# Patient Record
Sex: MALE | Race: WHITE | NOT HISPANIC OR LATINO | Employment: UNEMPLOYED | URBAN - METROPOLITAN AREA
[De-identification: names, ages, dates, MRNs, and addresses within clinical notes are randomized per-mention and may not be internally consistent; named-entity substitution may affect disease eponyms.]

---
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
## 2023-01-23 DIAGNOSIS — F90.0 ATTENTION DEFICIT HYPERACTIVITY DISORDER (ADHD), PREDOMINANTLY INATTENTIVE TYPE: ICD-10-CM

## 2023-01-23 RX ORDER — METHYLPHENIDATE HYDROCHLORIDE 54 MG/1
54 TABLET ORAL DAILY
Qty: 30 TABLET | Refills: 0
Start: 2023-01-23 | End: 2023-01-23 | Stop reason: SDUPTHER

## 2023-01-23 RX ORDER — METHYLPHENIDATE HYDROCHLORIDE 54 MG/1
54 TABLET ORAL DAILY
Qty: 30 TABLET | Refills: 0 | Status: SHIPPED | OUTPATIENT
Start: 2023-01-23

## 2023-01-23 NOTE — TELEPHONE ENCOUNTER
Medication Refill Request     Name of Medication Concerta  Dose/Frequency 54mg/ 1 tablet daily  Quantity 30 tablet  Verified pharmacy   [x]  Verified ordering Provider   [x]  Does patient have enough for the next 3 days? Yes [x] No []  Does patient have a follow-up appointment scheduled?  Yes [x] No []   If so when is appointment: 02/06/23

## 2023-02-06 ENCOUNTER — OFFICE VISIT (OUTPATIENT)
Dept: PSYCHIATRY | Facility: CLINIC | Age: 18
End: 2023-02-06

## 2023-02-06 NOTE — PSYCH
MEDICATION MANAGEMENT NOTE        [facility]      Name and Date of Birth:  Walter Ortiz 21 y o  2005 MRN: [id number]    Date of Visit: February 6, 2023    Reason for Visit: Follow-up visit regarding medication management     Chief Complaint: "Everything good"      SUBJECTIVE:    Greg Grimm a 21 y o , white, male, domiciled with mother and father in [place], recently graduated from [employer] 20[employer], currently looking for [employer] a previous psychiatric history significant for ADHD, anxiety, excoriations, no past psychiatric hosptalizations medically complicated by PNES vs Epilepsy, osteochondroma, who was personally seen and evaluated at the [address] [place] [address] E outpatient clinic for follow-up regarding medication management  Sandra Knutson states that since their previous outpatient psychiatric appointment with this writer, reports everything is "good"    Denies depression and anxiety  Denies recent seizure-like activity  No appetite or sleep disturbance  Does not currently have a job, and is currently searching  Last appointment's treatment recommendations:   • Continue the following medications:   ? Concerta ER 54 mg daily for symptoms of ADHD  ? Prozac 30 mg qd for exocoriations  • Continue lifestyle modifications  • Medication management every 3 months    Presently, patient denies suicidal/homicidal ideation in addition to thoughts of self-injury; contracts for safety, see below for risk assessment  At conclusion of evaluation, patient is amenable to the recommendations of this writer including: continue psychotropic emd Also, patient is amenable to calling/contacting the outpatient office including this writer if any acute adverse effects of their medication regimen arise in addition to any comments or concerns pertaining to their psychiatric management    Patient is amenable to calling/contacting crisis and/or attending to the nearest emergency department if their clinical condition deteriorates to assure their safety and stability, stating that they are able to appropriately confide in their parents regarding their psychiatric state  Current Rating Scores:     Current PHQ-9   PHQ-2/9 Depression Screening    Little interest or pleasure in doing things: 1 - several days  Feeling down, depressed, or hopeless: 0 - not at all  Trouble falling or staying asleep, or sleeping too much: 0 - not at all  Feeling tired or having little energy: 0 - not at all  Poor appetite or overeatin - not at all  Feeling bad about yourself - or that you are a failure or have let yourself or your family down: 0 - not at all  Trouble concentrating on things, such as reading the newspaper or watching television: 0 - not at all  Moving or speaking so slowly that other people could have noticed  Or the opposite - being so fidgety or restless that you have been moving around a lot more than usual: 0 - not at all  Thoughts that you would be better off dead, or of hurting yourself in some way: 0 - not at all  PHQ-9 Score: 1   PHQ-9 Interpretation: No or Minimal depression          Psychiatric Review Of Systems:    Unchanged information from this writer's previous assessment is copied and italicized; information that has changed is bolded      Appetite: some overeating, has been working on it  Adverse eating: denies currently  Weight changes: intentional weight loss  Insomnia/sleeplessness: no  Fatigue/anergy: no  Anhedonia/lack of interest: no  Attention/concentration: no  Psychomotor agitation/retardation: no  Somatic symptoms: no  Anxiety/panic attack: no  Katia/hypomania: no  Hopelessness/helplessness/worthlessness: no  Self-injurious behavior/high-risk behavior: not currently, history of excoriations (last episode about 2 years ago)  Suicidal ideation: no  Homicidal ideation: no  Auditory hallucinations: no  Visual hallucinations: no  Other perceptual disturbances: no  Delusional thinking: no  Obsessive/compulsive symptoms: no  Review Of Systems:      Constitutional negative   ENT negative   Cardiovascular negative   Respiratory negative   Gastrointestinal negative   Genitourinary negative   Musculoskeletal negative   Integumentary negative   Neurological negative   Endocrine negative   Other Symptoms none, all other systems are negative     History Review: The following portions of the patient's history were reviewed and updated as appropriate: allergies, current medications, past family history, past medical history, past social history, past surgical history and problem list     Unchanged information from this writer's previous assessment is copied and italicized; information that has changed is bolded  Family Psychiatric History:      Family History[]? Expand by Default             Family History   Problem Relation Age of Onset   • Asthma Mother     • No Known Problems Father     • Depression Maternal Grandfather           Denies substance abuse or suicidality in immediate relations       Past Psychiatric History:      Previous inpatient psychiatric admissions: denies  Previous intensive outpatient psychiatric services: denies  Previous inpatient/outpatient substance abuse rehabilitation: denies  Present/previous outpatient psychiatric services: Previously seeing Dr Kaur Carson since 2019  Present/previous psychotherapy services: Was in therapy for "anger management before 7th grade  History of suicidal attempts/gestures: denies  History of violence/aggressive behaviors: denies (got into one fight, more verbal)  Present/previous psychotropic medication use:   Vyvanse up to 60 mg (significant weight loss)  Adderall XR 20 mg (irritability)  Strattera (ineffective)  Intuniv 1mg (discontinued, as he no longer needed it)  Focalin XR 30 mg (ineffective)  Prozac up to 30 mg  Substance Abuse History:     Patient denies history of alcohol, illict substance, or tobacco abuse  Patient denies previous legal actions or arrests related to substance intoxication including prior DWIs/DUIs  Bunny does not apear under the influence or withdrawal of any psychoactive substance throughout today's examination       Social History:     Developmental: denies a history of milestone/developmental delay  Denies a history of in-utero exposure to toxins/illicit substances  Did have an IEP for Math or need for special education  Living arrangement: lives with mother and father, and pets  Academic history: graduated HS in 6/2022  Marital history: single  Social support system: friends, parent  Vocational History: currently looking for job position, interested in [place], [employer]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  Access to firearms: denies direct access to weapons/firearms  Bunny [employer] no history of arrests or violence pertaining to use of a deadly weapon       Traumatic History:      Abuse: denies  Other Traumatic Events: denies         OBJECTIVE:     Vital signs in last 24 hours: There were no vitals filed for this visit      Mental Status Evaluation:    Appearance age appropriate, casually dressed, overweight   Behavior cooperative, calm   Speech normal rate, normal volume, normal pitch   Mood "good"   Affect normal range and intensity, appropriate   Thought Processes organized, goal directed   Associations intact associations   Thought Content no overt delusions   Perceptual Disturbances: no auditory hallucinations, no visual hallucinations   Abnormal Thoughts  Risk Potential Suicidal ideation - None at present  Homicidal ideation - None at present  Potential for aggression - No   Orientation oriented to person, place and situation   Memory recent and remote memory grossly intact   Consciousness alert and awake   Attention Span Concentration Span attention span and concentration are age appropriate   Intellect appears to be of average intelligence   Insight intact Judgement intact   Muscle Strength and  Gait normal gait and normal balance   Motor activity no abnormal movements   Language no difficulty naming common objects, no difficulty repeating a phrase   Fund of Knowledge adequate knowledge of current events  adequate fund of knowledge regarding past history  adequate fund of knowledge regarding vocabulary    Pain none   Pain Scale 0     Laboratory Results: I have personally reviewed all pertinent laboratory/tests results    Recent Labs (last 6 months):   No visits with results within 6 Month(s) from this visit  Latest known visit with results is:   Orders Only on 10/12/2021   Component Date Value   • SARS-CoV-2 10/12/2021 Negative        Suicide/Homicide Risk Assessment:    Risk of Harm to Self:  The following ratings are based on assessment at the time of the interview  Demographic risk factors include: , male, age: young adult (15-24)  Historical Risk Factors include: none  Recent Specific Risk Factors include: none  Protective Factors: no current suicidal ideation  Based on today's assessment, Bunny presents the following risk of harm to self: minimal    Risk of Harm to Others: The following ratings are based on assessment at the time of the interview  Demographic Risk Factors include: male, unemployed, 14-21 years of age  Historical Risk Factors include: none  Recent Specific Risk Factors include: none  Protective Factors: no current homicidal ideation  Based on today's assessment, Bunny presents the following risk of harm to others: minimal    The following interventions are recommended: no intervention changes needed  Although patient's acute lethality risk is low, long-term/chronic lethality risk is mildly elevated in the presence of see above  At the current moment, Virgil Najjar is future-oriented, forward-thinking, and demonstrates ability to act in a self-preserving manner as evidenced by volitionally presenting to the clinic today, seeking treatment  To mitigate future risk, patient should adhere to the recommendations of this writer, avoid alcohol/illicit substance use, utilize community-based resources and familiar support and prioritize mental health treatment  Based on today's assessment and clinical criteria, Bunny Fritz Junior contracts for safety and is not an imminent risk of harm to self or others  Outpatient level of care is deemed appropriate at this present time  Manuel Cyr understands that if they are no longer able to contract for safety, they need to call/contact the outpatient office including this writer, call/contact crisis and/orattend to the nearest Emergency Department for immediate evaluation  Assessment/Plan:     Manuel Cyr was personally seen and evaluated today at the [address] [place] [address] [address] outpatient clinic for follow-up regarding medication management  His symptoms of ADHD are managed under current medications  Denies any depression or anxiety  Denies any SI, HI, AVH  Denies any recent seizure-like activities  Denies any recent excoriations  DSM-5 Diagnoses:     • ADHD, predominantly inattentive type managed on current medications  • Excoriations, history of, 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    Treatment Recommendations/Precautions:    • Continue the following medications:  o Concerta ER 54 mg daily for symptoms of ADHD  o Prozac 30 mg daily for excoriation  • Medication management every 3 months  • Aware of need to follow up with family physician for medical issues  • Aware of 24 hour and weekend coverage for urgent situations accessed by calling [facility] main practice number    Medications Risks/Benefits      Risks, Benefits And Possible Side Effects Of Medications:    Risks, benefits, and possible side effects of medications explained to Bunny and he verbalizes understanding and agreement for treatment   including: PARQ completed including serotonin syndrome, SIADH, worsening depression, suicidality, induction of ghassan, GI upset, headaches, activation, sexual side effects, sedation, potential drug interactions, and others  PARQ completed including elevated heart rate, elevated bp, seizures, anxiety/irritability, activation/induction of ghassan, abuse potential, interactions with other medications, risk of sudden death, appetite suppression/weight loss and other risks  For MALES- rare priapism        Controlled Medication Discussion:     Blima Oppenheim has been filling controlled prescriptions on time as prescribed according to [address] Monitoring Program    Psychotherapy Provided:     Individual psychotherapy provided: No     Treatment Plan:    Completed and signed during the session: Not applicable - Treatment Plan not due at this session    This note was shared with patient      Visit Time    Visit Start Time: 1:34 PM  Visit Stop Time: 1:46 PM  Total Visit Duration: 11 minutes    Ethelene Severin, MD 02/06/23

## 2023-03-02 DIAGNOSIS — F90.0 ATTENTION DEFICIT HYPERACTIVITY DISORDER (ADHD), PREDOMINANTLY INATTENTIVE TYPE: ICD-10-CM

## 2023-03-02 RX ORDER — METHYLPHENIDATE HYDROCHLORIDE 54 MG/1
54 TABLET ORAL DAILY
Qty: 30 TABLET | Refills: 0 | Status: SHIPPED | OUTPATIENT
Start: 2023-03-02

## 2023-03-02 NOTE — TELEPHONE ENCOUNTER
Medication Refill Request     Name of Medication Methylphenidate  Dose/Frequency 1 tablet daily  Quantity 30  Verified pharmacy   [x]  Verified ordering Provider   [x]  Does patient have enough for the next 3 days? Yes [] No [x]  Does patient have a follow-up appointment scheduled?  Yes [x] No []   If so when is appointment: 5/8/2023 1:30pm

## 2023-04-03 DIAGNOSIS — F90.0 ATTENTION DEFICIT HYPERACTIVITY DISORDER (ADHD), PREDOMINANTLY INATTENTIVE TYPE: ICD-10-CM

## 2023-04-03 NOTE — TELEPHONE ENCOUNTER
Medication Refill Request     Name of Medication Methylphenidate (concerta)  Dose/Frequency 54mg  Quantity 30  Verified pharmacy   [x]  Verified ordering Provider   [x]  Does patient have enough for the next 3 days? Yes [] No [x]  Does patient have a follow-up appointment scheduled?  Yes [x] No []   If so when is appointment: 5/8/23 @1:30pm

## 2023-04-04 RX ORDER — METHYLPHENIDATE HYDROCHLORIDE 54 MG/1
54 TABLET ORAL DAILY
Qty: 30 TABLET | Refills: 0 | Status: SHIPPED | OUTPATIENT
Start: 2023-04-04

## 2023-04-04 RX ORDER — METHYLPHENIDATE HYDROCHLORIDE 54 MG/1
54 TABLET ORAL DAILY
Qty: 30 TABLET | Refills: 0
Start: 2023-04-04

## 2023-05-05 DIAGNOSIS — F90.0 ATTENTION DEFICIT HYPERACTIVITY DISORDER (ADHD), PREDOMINANTLY INATTENTIVE TYPE: ICD-10-CM

## 2023-05-05 RX ORDER — METHYLPHENIDATE HYDROCHLORIDE 54 MG/1
54 TABLET ORAL DAILY
Qty: 30 TABLET | Refills: 0
Start: 2023-05-05

## 2023-05-08 ENCOUNTER — OFFICE VISIT (OUTPATIENT)
Dept: PSYCHIATRY | Facility: CLINIC | Age: 18
End: 2023-05-08

## 2023-05-08 DIAGNOSIS — F90.0 ATTENTION DEFICIT HYPERACTIVITY DISORDER (ADHD), PREDOMINANTLY INATTENTIVE TYPE: Primary | ICD-10-CM

## 2023-05-08 DIAGNOSIS — F42.4 EXCORIATION (SKIN-PICKING) DISORDER: ICD-10-CM

## 2023-05-08 RX ORDER — FLUOXETINE 10 MG/1
CAPSULE ORAL
Qty: 90 CAPSULE | Refills: 0 | Status: SHIPPED | OUTPATIENT
Start: 2023-05-08

## 2023-05-08 RX ORDER — METHYLPHENIDATE HYDROCHLORIDE 54 MG/1
54 TABLET ORAL DAILY
Qty: 30 TABLET | Refills: 0 | Status: SHIPPED | OUTPATIENT
Start: 2023-05-08

## 2023-05-08 RX ORDER — METHYLPHENIDATE HYDROCHLORIDE 54 MG/1
54 TABLET ORAL DAILY
Qty: 30 TABLET | Refills: 0
Start: 2023-05-08 | End: 2023-05-08 | Stop reason: SDUPTHER

## 2023-05-08 RX ORDER — METHYLPHENIDATE HYDROCHLORIDE 54 MG/1
54 TABLET ORAL DAILY
Qty: 30 TABLET | Refills: 0
Start: 2023-06-06

## 2023-05-08 RX ORDER — FLUOXETINE HYDROCHLORIDE 20 MG/1
20 CAPSULE ORAL DAILY
Qty: 90 CAPSULE | Refills: 0 | Status: SHIPPED | OUTPATIENT
Start: 2023-05-08

## 2023-05-08 RX ORDER — METHYLPHENIDATE HYDROCHLORIDE 54 MG/1
54 TABLET ORAL DAILY
Qty: 30 TABLET | Refills: 0
Start: 2023-07-04

## 2023-05-08 NOTE — BH TREATMENT PLAN
TREATMENT PLAN (Medication Management Only)        [address] [place] [address]    Name and Date of Birth:  Akshat Grandzachariah 21 y o  2005  Date of Treatment Plan: May 8, 2023  Diagnosis/Diagnoses:    1  Attention deficit hyperactivity disorder (ADHD), predominantly inattentive type    2  Excoriation (skin-picking) disorder      Strengths/Personal Resources for Self-Care: supportive family, taking medications as prescribed, ability to adapt to life changes, ability to communicate needs  Area/Areas of need (in own words): ADHD symptoms  1  Long Term Goal: maintain improvement in ADHD symptoms  Target Date:6 months - 11/8/2023  Person/Persons responsible for completion of goal: Bunny  2  Short Term Objective (s) - How will we reach this goal?:   A  Provider new recommended medication/dosage changes and/or continue medication(s): continue current medications as prescribed  B  Increase socialization with peers  C  Exercise regularly   Target Date:6 months - 11/8/2023  Person/Persons Responsible for Completion of Goal: Bunny  Progress Towards Goals: moderate progress  Treatment Modality: medication management every 3 months  Review due 180 days from date of this plan: 6 months - 11/8/2023  Expected length of service: maintenance  My Physician/PA/NP and I have developed this plan together and I agree to work on the goals and objectives  I understand the treatment goals that were developed for my treatment

## 2023-05-08 NOTE — PSYCH
"MEDICATION MANAGEMENT NOTE        [facility]      Name and Date of Birth:  Ave Yung 21 y o  2005 MRN: [id number]    Date of Visit: May 8, 2023    Reason for Visit: Follow-up visit regarding medication management     Chief Complaint: \"I am doing pretty good\"    SUBJECTIVE:    Ad purvis 21 y o , white, male, domiciled with mother and father in [place], recently graduated from [employer] 2022, currently looking for [address] a previous psychiatric history significant for ADHD, anxiety, excoriations, no past psychiatric hosptalizations medically complicated by PNES vs Epilepsy, osteochondroma, who was personally seen and evaluated at the [address] [place] [address] [address] outpatient clinic for follow-up regarding medication management  Linda Mccallaham states that since their previous outpatient psychiatric appointment with this writer, he reports he has been \"pretty good  \" Reports adequate coverage of symptoms of ADHD  Denies any mood symptoms as well as symptoms of anxiety  Denies any recent excoriations  Denies any other self-injurious behaviors  He states that he continues to look for a job  Presently, patient denies suicidal/homicidal ideation in addition to thoughts of self-injury; contracts for safety, see below for risk assessment  At conclusion of evaluation, patient is amenable to the recommendations of this writer including: continue psychotropic medications as prescribed  Also, patient is amenable to calling/contacting the outpatient office including this writer if any acute adverse effects of their medication regimen arise in addition to any comments or concerns pertaining to their psychiatric management    Patient is amenable to calling/contacting crisis and/or attending to the nearest emergency department if their clinical condition deteriorates to assure their safety and stability, stating that they " are able to appropriately confide in this writer regarding their psychiatric state  Current Rating Scores:     Current PHQ-9   PHQ-2/9 Depression Screening    Little interest or pleasure in doing things: 0 - not at all  Feeling down, depressed, or hopeless: 0 - not at all  Trouble falling or staying asleep, or sleeping too much: 0 - not at all  Feeling tired or having little energy: 0 - not at all  Poor appetite or overeatin - not at all  Feeling bad about yourself - or that you are a failure or have let yourself or your family down: 0 - not at all  Trouble concentrating on things, such as reading the newspaper or watching television: 0 - not at all  Moving or speaking so slowly that other people could have noticed  Or the opposite - being so fidgety or restless that you have been moving around a lot more than usual: 0 - not at all  Thoughts that you would be better off dead, or of hurting yourself in some way: 0 - not at all  PHQ-9 Score: 0   PHQ-9 Interpretation: No or Minimal depression        Current ROWDY-7 is   ROWDY-7 Flowsheet Screening    Flowsheet Row Most Recent Value   Over the last 2 weeks, how often have you been bothered by any of the following problems? Feeling nervous, anxious, or on edge 0   Not being able to stop or control worrying 0   Worrying too much about different things 0   Trouble relaxing 0   Being so restless that it is hard to sit still 0   Becoming easily annoyed or irritable 0   Feeling afraid as if something awful might happen 0   ROWDY-7 Total Score 0        Psychiatric Review Of Systems:    Unchanged information from this writer's previous assessment is copied and italicized; information that has changed is bolded      Appetite: some overeating, has been working on it  Adverse eating: denies currently  Weight changes: intentional weight loss  Insomnia/sleeplessness: no  Fatigue/anergy: no  Anhedonia/lack of interest: no  Attention/concentration: no  Psychomotor "agitation/retardation: no  Somatic symptoms: no  Anxiety/panic attack: no  Katia/hypomania: no  Hopelessness/helplessness/worthlessness: no  Self-injurious behavior/high-risk behavior: not currently, history of excoriations (last episode about 2 years ago)  Suicidal ideation: no  Homicidal ideation: no  Auditory hallucinations: no  Visual hallucinations: no  Other perceptual disturbances: no  Delusional thinking: no  Obsessive/compulsive symptoms: no  Review Of Systems:      Constitutional negative   ENT negative   Cardiovascular negative   Respiratory negative   Gastrointestinal negative   Genitourinary negative   Musculoskeletal negative   Integumentary negative   Neurological negative   Endocrine negative   Other Symptoms none, all other systems are negative     History Review: The following portions of the patient's history were reviewed and updated as appropriate: allergies, current medications, past family history, past medical history, past social history, past surgical history and problem list     Unchanged information from this writer's previous assessment is copied and italicized; information that has changed is bolded        Family Psychiatric History:      Family History[]? ? Expand by Default             Family History   Problem Relation Age of Onset   • Asthma Mother     • No Known Problems Father     • Depression Maternal Grandfather           Denies substance abuse or suicidality in immediate relations       Past Psychiatric History:      Previous inpatient psychiatric admissions: denies  Previous intensive outpatient psychiatric services: denies  Previous inpatient/outpatient substance abuse rehabilitation: denies  Present/previous outpatient psychiatric services: Previously seeing Dr Gómez Malcolm since 2019  Present/previous psychotherapy services: Was in therapy for \"anger management before 7th grade  History of suicidal attempts/gestures: denies  History of violence/aggressive behaviors: denies " (got into one fight, more verbal)  Present/previous psychotropic medication use:   Vyvanse up to 60 mg (significant weight loss)  Adderall XR 20 mg (irritability)  Strattera (ineffective)  Intuniv 1mg (discontinued, as he no longer needed it)  Focalin XR 30 mg (ineffective)  Prozac up to 30 mg  Substance Abuse History:     Patient denies history of alcohol, illict substance, or tobacco abuse  Patient denies previous legal actions or arrests related to substance intoxication including prior DWIs/DUIs  Bunny does not apear under the influence or withdrawal of any psychoactive substance throughout today's examination       Social History:     Developmental: denies a history of milestone/developmental delay  Denies a history of in-utero exposure to toxins/illicit substances  Did have an IEP for Math or need for special education  Living arrangement: lives with mother and father, and pets  Academic history: graduated HS in 6/2022  Marital history: single  Social support system: friends, parent  Vocational History: currently looking for job position, interested in [place], [facility]  Access to firearms: denies direct access to weapons/firearms  Bunny [employer] no history of arrests or violence pertaining to use of a deadly weapon       Traumatic History:      Abuse: denies  Other Traumatic Events: denies            OBJECTIVE:     Vital signs in last 24 hours: There were no vitals filed for this visit      Mental Status Evaluation:    Appearance age appropriate, casually dressed   Behavior pleasant, cooperative, calm   Speech normal rate, normal volume, normal pitch   Mood euthymic   Affect Brighter than previous appointments   Thought Processes organized, goal directed   Associations intact associations   Thought Content no overt delusions   Perceptual Disturbances: no auditory hallucinations, no visual hallucinations   Abnormal Thoughts  Risk Potential Suicidal ideation - None  Homicidal ideation - None  Potential for aggression - No   Orientation oriented to person, place, time/date and situation   Memory recent and remote memory grossly intact   Consciousness alert and awake   Attention Span Concentration Span attention span and concentration are age appropriate   Intellect appears to be of average intelligence   Insight intact   Judgement intact   Muscle Strength and  Gait normal gait and normal balance   Motor activity no abnormal movements   Language no difficulty naming common objects, no difficulty repeating a phrase   Fund of Knowledge adequate knowledge of current events  adequate fund of knowledge regarding past history  adequate fund of knowledge regarding vocabulary    Pain none   Pain Scale 0     Laboratory Results: I have personally reviewed all pertinent laboratory/tests results    Recent Labs (last 6 months):   No visits with results within 6 Month(s) from this visit  Latest known visit with results is:   Orders Only on 10/12/2021   Component Date Value   • SARS-CoV-2 10/12/2021 Negative        Suicide/Homicide Risk Assessment:    Risk of Harm to Self:  The following ratings are based on assessment at the time of the interview  Demographic risk factors include: , male  Historical Risk Factors include: none  Recent Specific Risk Factors include: none  Protective Factors: no current suicidal ideation  Based on today's assessmentBunny presents the following risk of harm to self: minimal    Risk of Harm to Others: The following ratings are based on assessment at the time of the interview  Demographic Risk Factors include: male, under age 36  Historical Risk Factors include: none  Recent Specific Risk Factors include: none  Protective Factors: no current homicidal ideation  Based on today's assessmentBunny presents the following risk of harm to others: minimal    The following interventions are recommended: no intervention changes needed   Although patient's acute lethality risk is low, long-term/chronic lethality risk is mildly elevated in the presence of see above  At the current moment, Bunny George is future-oriented, forward-thinking, and demonstrates ability to act in a self-preserving manner as evidenced by volitionally presenting to the clinic today, seeking treatment  To mitigate future risk, patient should adhere to the recommendations of this writer, avoid alcohol/illicit substance use, utilize community-based resources and familiar support and prioritize mental health treatment  Based on today's assessment and clinical criteria, Bunny Padgett contracts for safety and is not an imminent risk of harm to self or others  Outpatient level of care is deemed appropriate at this present time  Bunny George understands that if they are no longer able to contract for safety, they need to call/contact the outpatient office including this writer, call/contact crisis and/orattend to the nearest Emergency Department for immediate evaluation  Assessment/Plan:     Bunny George was personally seen and evaluated today at the [address] [place] [address] [address] outpatient clinic for follow-up regarding medication management  He denies any symptoms of depression, anxiety, and symptoms of ADHD are managed under current medications  Denies any side effects to medications  Denies any sleep or appetite disturbance  Denies SI, HI, AVH  DSM-5 Diagnoses:     • ADHD, predominantly inattentive type managed on current medications  • Excoriations, history of, 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    Treatment Recommendations/Precautions:    • Continue the following medications:  ? Concerta ER 54 mg daily for symptoms of ADHD  ?  Prozac 30 mg daily for excoriation  • Medication management every 3 months  • Aware of 24 hour and weekend coverage for urgent situations accessed by calling [facility] main practice number    Medications Risks/Benefits      Risks, Benefits And Possible Side Effects Of Medications:    Risks, benefits, and possible side effects of medications explained to Bunny and he verbalizes understanding and agreement for treatment  including: PARQ completed including serotonin syndrome, SIADH, worsening depression, suicidality, induction of ghassan, GI upset, headaches, activation, sexual side effects, sedation, potential drug interactions, and others  PARQ completed including elevated heart rate, elevated bp, seizures, anxiety/irritability, activation/induction of ghassan, abuse potential, interactions with other medications, risk of sudden death, appetite suppression/weight loss and other risks  For MALES- rare priapism        Controlled Medication Discussion:     Felecia Younger has been filling controlled prescriptions on time as prescribed according to [address] Monitoring Program    Psychotherapy Provided:     Individual psychotherapy provided: No     Treatment Plan:    Completed and signed during the session: Yes - Treatment Plan done but not signed at time of office visit due to:  Plan reviewed in person and verbal consent given due to [address] distancing    This note was shared with patient      Visit Time    Visit Start Time: 1:15 PM  Visit Stop Time: 1:30 PM  Total Visit Duration: 15 minutes    Becky Couch MD 05/08/23

## 2023-05-11 NOTE — TELEPHONE ENCOUNTER
Medication Refill Request     Name of Medication Concerta   Dose/Frequency 54 mg/ Take 1 tablet by mouth daily  Quantity 30  Verified pharmacy   [x]  Verified ordering Provider   [x]  Does patient have enough for the next 3 days? Yes [] No [x]  Does patient have a follow-up appointment scheduled?  Yes [x] No []   If so when is appointment: 5/8/2023
Will provide refill of Concerta supervising Dr Katty Sullivan 
Regular Diet - No restrictions

## 2023-05-16 ENCOUNTER — TELEPHONE (OUTPATIENT)
Dept: PSYCHIATRY | Facility: CLINIC | Age: 18
End: 2023-05-16

## 2023-07-10 DIAGNOSIS — F90.0 ATTENTION DEFICIT HYPERACTIVITY DISORDER (ADHD), PREDOMINANTLY INATTENTIVE TYPE: ICD-10-CM

## 2023-07-10 RX ORDER — METHYLPHENIDATE HYDROCHLORIDE 54 MG/1
54 TABLET ORAL DAILY
Qty: 30 TABLET | Refills: 0
Start: 2023-07-10 | End: 2023-07-10 | Stop reason: SDUPTHER

## 2023-07-10 RX ORDER — METHYLPHENIDATE HYDROCHLORIDE 54 MG/1
54 TABLET ORAL DAILY
Qty: 30 TABLET | Refills: 0 | Status: SHIPPED | OUTPATIENT
Start: 2023-07-10

## 2023-07-10 NOTE — TELEPHONE ENCOUNTER
Patients father called to check the status of the prescription he called in earlier, writer transferred caller to nursing line for assistance

## 2023-07-10 NOTE — TELEPHONE ENCOUNTER
Patient out of medication. Please re-send as it looks like there is no confirmed receipt of the Concerta.

## 2023-07-10 NOTE — TELEPHONE ENCOUNTER
Patient's father contacted the office seeking an update on medication refill request. Writer contacted the pharmacy to verify if they received the prescription. Pharmacist verified that they did not receive a script for the Concerta 54 mg tablets. Writer informed Patient's father that he will relay message to the Provider. Patient's father stated patient does not have any medication left.

## 2023-07-10 NOTE — TELEPHONE ENCOUNTER
Medication Refill Request     Name of Medication Concerta  Dose/Frequency 54mg ER  Quantity 30  Verified pharmacy   [x]  Verified ordering Provider   [x]  Does patient have enough for the next 3 days? Yes [] No [x]  Does patient have a follow-up appointment scheduled?  Yes [x] No []   If so when is appointment: 9/8/2023 8:30am

## 2023-07-13 NOTE — TELEPHONE ENCOUNTER
Elpidio Stuart,  Could you contact the pharmacy. I sent medication twice and the pharmacy has a receipt of confirmation. Thanks,  OI. Double check that I sent it to the right pharmacy.

## 2023-08-02 DIAGNOSIS — F42.4 EXCORIATION (SKIN-PICKING) DISORDER: ICD-10-CM

## 2023-08-02 RX ORDER — FLUOXETINE 10 MG/1
CAPSULE ORAL
Qty: 90 CAPSULE | Refills: 0 | Status: SHIPPED | OUTPATIENT
Start: 2023-08-02

## 2023-08-06 DIAGNOSIS — F42.4 EXCORIATION (SKIN-PICKING) DISORDER: ICD-10-CM

## 2023-08-07 ENCOUNTER — OFFICE VISIT (OUTPATIENT)
Dept: FAMILY MEDICINE CLINIC | Facility: CLINIC | Age: 18
End: 2023-08-07
Payer: COMMERCIAL

## 2023-08-07 VITALS
WEIGHT: 210.8 LBS | HEART RATE: 119 BPM | SYSTOLIC BLOOD PRESSURE: 126 MMHG | DIASTOLIC BLOOD PRESSURE: 74 MMHG | BODY MASS INDEX: 36.18 KG/M2 | TEMPERATURE: 97.2 F | OXYGEN SATURATION: 96 %

## 2023-08-07 DIAGNOSIS — J40 BRONCHITIS: Primary | ICD-10-CM

## 2023-08-07 DIAGNOSIS — F42.4 EXCORIATION (SKIN-PICKING) DISORDER: ICD-10-CM

## 2023-08-07 PROCEDURE — 99213 OFFICE O/P EST LOW 20 MIN: CPT | Performed by: FAMILY MEDICINE

## 2023-08-07 RX ORDER — FLUOXETINE HYDROCHLORIDE 20 MG/1
20 CAPSULE ORAL DAILY
Qty: 90 CAPSULE | Refills: 0 | Status: SHIPPED | OUTPATIENT
Start: 2023-08-07

## 2023-08-07 RX ORDER — AZITHROMYCIN 250 MG/1
TABLET, FILM COATED ORAL
Qty: 6 TABLET | Refills: 0 | Status: SHIPPED | OUTPATIENT
Start: 2023-08-07 | End: 2023-08-12

## 2023-08-07 RX ORDER — METHYLPREDNISOLONE 4 MG/1
TABLET ORAL
Qty: 21 EACH | Refills: 0 | Status: SHIPPED | OUTPATIENT
Start: 2023-08-07

## 2023-08-07 NOTE — PROGRESS NOTES
Name: Johann Membreno      : 2005      MRN: [id number]  Encounter Provider: Marifer Dixon DO  Encounter Date: 2023   Encounter department: [address] [place] [address]     Chief Complaint   Patient presents with   • Cough     Productive    • Sore Throat     BMI Counseling: Body mass index is 36.18 kg/m². The BMI is above normal. Nutrition recommendations include reducing portion sizes, reducing fast food intake, consuming healthier snacks, decreasing soda and/or juice intake, moderation in carbohydrate intake and increasing intake of lean protein. 1. Bronchitis  -     methylPREDNISolone 4 MG tablet therapy pack; Use as directed on package  -     azithromycin (Zithromax) 250 mg tablet; Take 2 tablets (500 mg total) by mouth daily for 1 day, THEN 1 tablet (250 mg total) daily for 4 days. Subjective     Cough, green production, sore throat improved. Review of Systems   Constitutional: Negative. HENT: Negative. Eyes: Negative. Respiratory: Positive for cough. Cardiovascular: Negative. Gastrointestinal: Negative. Genitourinary: Negative. Musculoskeletal: Negative. Skin: Negative. Neurological: Negative. Psychiatric/Behavioral: Negative.         Past Medical History:   Diagnosis Date   • Anxiety    • Depression    • Obesity      Past Surgical History:   Procedure Laterality Date   • LEG SURGERY      Osteochondroma     Family History   Problem Relation Age of Onset   • Asthma Mother    • No Known Problems Father    • Depression Maternal Grandfather      Social History     Socioeconomic History   • Marital status: Single     Spouse name: None   • Number of children: None   • Years of education: None   • Highest education level: None   Occupational History   • None   Tobacco Use   • Smoking status: Never   • Smokeless tobacco: Never   Vaping Use   • Vaping Use: Never used   Substance and Sexual Activity   • Alcohol use: Yes     Comment: once a month • Drug use: Never   • Sexual activity: Not Currently   Other Topics Concern   • None   Social History Narrative   • None     Social Determinants of Health     Financial Resource Strain: Not on file   Food Insecurity: Not on file   Transportation Needs: Not on file   Physical Activity: Not on file   Stress: Not on file   Social Connections: Not on file   Intimate Partner Violence: Not on file   Housing Stability: Not on file     Current Outpatient Medications on File Prior to Visit   Medication Sig   • FLUoxetine (PROzac) 10 mg capsule TAKE 1 CAPSULE DAILY TOGETHER WITH 20MG FOR TOTAL OF 30MG A DAY   • FLUoxetine (PROzac) 20 mg capsule TAKE ONE CAPSULE BY MOUTH EVERY DAY   • FLUoxetine (PROzac) 20 mg capsule Take 1 capsule (20 mg total) by mouth daily   • methylphenidate (CONCERTA) 54 MG ER tablet Take 1 tablet (54 mg total) by mouth daily Max Daily Amount: 54 mg   • methylphenidate (CONCERTA) 54 MG ER tablet Take 1 tablet (54 mg total) by mouth daily Max Daily Amount: 54 mg   • methylphenidate (CONCERTA) 54 MG ER tablet Take 1 tablet (54 mg total) by mouth daily Max Daily Amount: 54 mg     Allergies   Allergen Reactions   • Penicillins    • Shellfish-Derived Products - Food Allergy    • Shellfish-Derived Products - Food Allergy Other (See Comments)     Tested positive for allergy   • Penicillins Rash   • Tomato - Food Allergy Rash     Immunization History   Administered Date(s) Administered   • COVID-19 PFIZER VACCINE 0.3 ML IM 04/09/2021, 04/30/2021   • COVID-19 Pfizer vac (Luca-sucrose, gray cap) 12 yr+ IM 01/27/2022   • DTaP,unspecified 2005, 2005, 2005, 12/13/2004, 06/30/2008   • Hepatitis B 2005, 2005, 03/23/2004   • IPV 2005, 2005, 12/13/2004, 06/30/2008   • Influenza Quadrivalent Preservative Free 3 years and older IM 11/01/2016   • Influenza, injectable, quadrivalent, preservative free 0.5 mL 11/03/2020   • MMR 09/13/2004, 06/25/2007   • Meningococcal ACWY, unspecified 07/07/2016   • Tdap 07/07/2016   • Varicella 06/07/2004, 06/25/2007   • meningococcal ACYW-135 TT Conjugate 08/09/2021       Objective     /74 (BP Location: Left arm, Patient Position: Sitting, Cuff Size: Large)   Pulse (!) 119   Temp (!) 97.2 °F (36.2 °C) (Temporal)   Wt 95.6 kg (210 lb 12.8 oz)   SpO2 96%   BMI 36.18 kg/m²     Physical Exam  Constitutional:       Appearance: He is well-developed. HENT:      Head: Normocephalic and atraumatic. Right Ear: Tympanic membrane, ear canal and external ear normal.      Left Ear: Ear canal and external ear normal.      Nose: Nose normal.      Mouth/Throat:      Mouth: Mucous membranes are moist.      Pharynx: Oropharynx is clear. Eyes:      Conjunctiva/sclera: Conjunctivae normal.      Pupils: Pupils are equal, round, and reactive to light. Cardiovascular:      Rate and Rhythm: Normal rate and regular rhythm. Heart sounds: Normal heart sounds. Pulmonary:      Effort: Pulmonary effort is normal.      Breath sounds: Normal breath sounds. Musculoskeletal:      Cervical back: Normal range of motion and neck supple. Skin:     General: Skin is warm and dry. Neurological:      Mental Status: He is alert and oriented to person, place, and time. Deep Tendon Reflexes: Reflexes are normal and symmetric. Psychiatric:         Behavior: Behavior normal.         Thought Content:  Thought content normal.         Judgment: Judgment normal.       Gómez Soto,

## 2023-09-05 DIAGNOSIS — F90.0 ATTENTION DEFICIT HYPERACTIVITY DISORDER (ADHD), PREDOMINANTLY INATTENTIVE TYPE: ICD-10-CM

## 2023-09-05 RX ORDER — METHYLPHENIDATE HYDROCHLORIDE 54 MG/1
54 TABLET ORAL DAILY
Qty: 30 TABLET | Refills: 0
Start: 2023-09-05

## 2023-09-07 RX ORDER — METHYLPHENIDATE HYDROCHLORIDE 54 MG/1
54 TABLET ORAL DAILY
Qty: 30 TABLET | Refills: 0 | Status: SHIPPED | OUTPATIENT
Start: 2023-09-07

## 2023-09-08 ENCOUNTER — OFFICE VISIT (OUTPATIENT)
Dept: PSYCHIATRY | Facility: CLINIC | Age: 18
End: 2023-09-08

## 2023-09-08 DIAGNOSIS — F90.0 ATTENTION DEFICIT HYPERACTIVITY DISORDER (ADHD), PREDOMINANTLY INATTENTIVE TYPE: Primary | ICD-10-CM

## 2023-09-08 DIAGNOSIS — F42.4 EXCORIATION (SKIN-PICKING) DISORDER: ICD-10-CM

## 2023-09-08 PROCEDURE — NC001 PR NO CHARGE: Performed by: PSYCHIATRY & NEUROLOGY

## 2023-09-08 RX ORDER — FLUOXETINE 10 MG/1
CAPSULE ORAL
Qty: 90 CAPSULE | Refills: 0 | Status: SHIPPED | OUTPATIENT
Start: 2023-09-08

## 2023-09-08 RX ORDER — SODIUM FLUORIDE 5 MG/G
CREAM DENTAL
COMMUNITY
Start: 2023-09-07

## 2023-09-08 RX ORDER — FLUOXETINE HYDROCHLORIDE 20 MG/1
20 CAPSULE ORAL DAILY
Qty: 90 CAPSULE | Refills: 0 | Status: SHIPPED | OUTPATIENT
Start: 2023-09-08

## 2023-09-08 NOTE — PSYCH
MEDICATION MANAGEMENT NOTE        [facility]      Name and Date of Birth:  Garrett Moore 19 y.o. 2005 MRN: [id number]    Date of Visit: September 8, 2023    Reason for Visit: Follow-up visit regarding medication management     Chief Complaint: "I'm doing fine" . SUBJECTIVE:    Bunny Pandey is a 19 y.o., white, male, domiciled with mother and father in [place], recently graduated from [employer] 2022, currently looking for employment,  possessing a previous psychiatric history significant for ADHD, anxiety, excoriations, no past psychiatric hosptalizations medically complicated by PNES vs Epilepsy, osteochondroma,who was personally seen and evaluated at the [address] [place] [address] outpatient clinic for follow-up regarding medication management. Rena Christi states that since their previous outpatient psychiatric appointment with this writer, he is doing "fine." Reports that he recently got a job as an  with [employer].     Denies any depression or associated symptoms including appetite and sleep disturbance, decreased energy and anhedonia. Denies any anxiety at this time. Denies any side effects to medications. Presently, patient denies suicidal/homicidal ideation in addition to thoughts of self-injury; contracts for safety, see below for risk assessment. At conclusion of evaluation, patient is amenable to the recommendations of this writer including: continue psychotropic medications as prescribed. Also, patient is amenable to calling/contacting the outpatient office including this writer if any acute adverse effects of their medication regimen arise in addition to any comments or concerns pertaining to their psychiatric management.   Patient is amenable to calling/contacting crisis and/or attending to the nearest emergency department if their clinical condition deteriorates to assure their safety and stability, stating that they are able to appropriately confide in this writer regarding their psychiatric state. Current Rating Scores:     None completed today. Psychiatric Review Of Systems:    Unchanged information from this writer's previous assessment is copied and italicized; information that has changed is bolded. Appetite: some overeating, has been working on it  Adverse eating: denies currently  Weight changes: intentional weight loss  Insomnia/sleeplessness: no  Fatigue/anergy: no  Anhedonia/lack of interest: no  Attention/concentration: no  Psychomotor agitation/retardation: no  Somatic symptoms: no  Anxiety/panic attack: no  Katia/hypomania: no  Hopelessness/helplessness/worthlessness: no  Self-injurious behavior/high-risk behavior: not currently, history of excoriations (last episode about 2 years ago)  Suicidal ideation: no  Homicidal ideation: no  Auditory hallucinations: no  Visual hallucinations: no  Other perceptual disturbances: no  Delusional thinking: no  Obsessive/compulsive symptoms: no  Review Of Systems:      Constitutional negative   ENT negative   Cardiovascular negative   Respiratory negative   Gastrointestinal negative   Genitourinary negative   Musculoskeletal negative   Integumentary negative   Neurological negative   Endocrine negative   Other Symptoms none, all other systems are negative     History Review: The following portions of the patient's history were reviewed and updated as appropriate: allergies, current medications, past family history, past medical history, past social history, past surgical history and problem list.    Unchanged information from this writer's previous assessment is copied and italicized; information that has changed is bolded. Family Psychiatric History:      Family History[]? ? ? Expand by Default             Family History   Problem Relation Age of Onset   • Asthma Mother     • No Known Problems Father     • Depression Maternal Grandfather        .  Denies substance abuse or suicidality in immediate relations.      Past Psychiatric History:      Previous inpatient psychiatric admissions: denies  Previous intensive outpatient psychiatric services: denies  Previous inpatient/outpatient substance abuse rehabilitation: denies  Present/previous outpatient psychiatric services: Previously seeing Dr. Sha Rapp since 2019  Present/previous psychotherapy services: Was in therapy for "anger management before 7th grade  History of suicidal attempts/gestures: denies  History of violence/aggressive behaviors: denies (got into one fight, more verbal)  Present/previous psychotropic medication use:   Vyvanse up to 60 mg (significant weight loss)  Adderall XR 20 mg (irritability)  Strattera (ineffective)  Intuniv 1mg (discontinued, as he no longer needed it)  Focalin XR 30 mg (ineffective)  Prozac up to 30 mg  Substance Abuse History:     Patient denies history of alcohol, illict substance, or tobacco abuse. Patient denies previous legal actions or arrests related to substance intoxication including prior DWIs/DUIs. Bunny does not apear under the influence or withdrawal of any psychoactive substance throughout today's examination.      Social History:     Developmental: denies a history of milestone/developmental delay. Denies a history of in-utero exposure to toxins/illicit substances. Did have an IEP for Math or need for special education.   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 Bunny [employer] no history of arrests or violence pertaining to use of a deadly weapon.      Traumatic History:      Abuse: denies  Other Traumatic Events: denies            OBJECTIVE: Vital signs in last 24 hours: There were no vitals filed for this visit. Mental Status Evaluation:    Appearance age appropriate, casually dressed, overweight, glasses   Behavior pleasant, cooperative, calm   Speech normal rate, normal volume, normal pitch   Mood euthymic   Affect brighter than previous appointments   Thought Processes organized, goal directed   Associations intact associations   Thought Content no overt delusions   Perceptual Disturbances: no auditory hallucinations, no visual hallucinations   Abnormal Thoughts  Risk Potential Suicidal ideation - None  Homicidal ideation - None  Potential for aggression - No   Orientation oriented to person, place and time/date   Memory recent and remote memory grossly intact   Consciousness alert and awake   Attention Span Concentration Span attention span and concentration are age appropriate   Intellect appears to be of average intelligence   Insight intact   Judgement intact   Muscle Strength and  Gait normal muscle strength and normal muscle tone, normal gait and normal balance   Motor activity no abnormal movements   Language no difficulty naming common objects, no difficulty repeating a phrase, no difficulty writing a sentence   Fund of Knowledge adequate knowledge of current events  adequate fund of knowledge regarding past history  adequate fund of knowledge regarding vocabulary    Pain none   Pain Scale 0     Laboratory Results: I have personally reviewed all pertinent laboratory/tests results    Recent Labs (last 6 months):   No visits with results within 6 Month(s) from this visit.    Latest known visit with results is:   Orders Only on 10/12/2021   Component Date Value   • SARS-CoV-2 10/12/2021 Negative        Suicide/Homicide Risk Assessment:    Risk of Harm to Self:  The following ratings are based on assessment at the time of the interview  Demographic risk factors include: , male, age: young adult (15-24)  Historical Risk Factors include: history of self-abusive behavior  Recent Specific Risk Factors include: none  Protective Factors: no current suicidal ideation  Based on today's assessment, Bunny presents the following risk of harm to self: minimal    Risk of Harm to Others: The following ratings are based on assessment at the time of the interview  Demographic Risk Factors include: male, 15-23 years of age. Historical Risk Factors include: none. Recent Specific Risk Factors include: none. Protective Factors: no current homicidal ideation  Based on today's assessment, Bunny presents the following risk of harm to others: minimal    The following interventions are recommended: no intervention changes needed. Although patient's acute lethality risk is low, long-term/chronic lethality risk is mildly elevated in the presence of see above. At the current moment, Herminia Love is future-oriented, forward-thinking, and demonstrates ability to act in a self-preserving manner as evidenced by volitionally presenting to the clinic today, seeking treatment. To mitigate future risk, patient should adhere to the recommendations of this writer, avoid alcohol/illicit substance use, utilize community-based resources and familiar support and prioritize mental health treatment. Based on today's assessment and clinical criteria, Bunny Tillman File contracts for safety and is not an imminent risk of harm to self or others. Outpatient level of care is deemed appropriate at this present time. Herminia Love understands that if they are no longer able to contract for safety, they need to call/contact the outpatient office including this writer, call/contact crisis and/orattend to the nearest Emergency Department for immediate evaluation. Assessment/Plan:     Herminia Love was personally seen and evaluated today at the [address] [place] [address] outpatient clinic for follow-up regarding medication management.  He denies any symptoms of depression or anxiety and symptoms of ADHD are managed under current medications. Denies any recent excoriations. Denies AVH. Denies SI/HI. HE reports he recently got a new job which he has been working towards. Denies side effects to medications. DSM-5 Diagnoses:     • ADHD, predominantly inattentive type managed on current medications  • Excoriations, history of, 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    Treatment Recommendations/Precautions:    • Continue the following medications:  ? Concerta ER 54 mg daily for symptoms of ADHD  ? Prozac 30 mg daily for excoriation  • Medication management every 3 months  • Aware of 24 hour and weekend coverage for urgent situations accessed by calling [facility] main practice number    Medications Risks/Benefits      Risks, Benefits And Possible Side Effects Of Medications:    Risks, benefits, and possible side effects of medications explained to Bunny and he verbalizes understanding and agreement for treatment. including: PARQ completed including serotonin syndrome, SIADH, worsening depression, suicidality, induction of ghassan, GI upset, headaches, activation, sexual side effects, sedation, potential drug interactions, and others. PARQ completed including elevated heart rate, elevated bp, seizures, anxiety/irritability, activation/induction of ghassan, abuse potential, interactions with other medications, risk of sudden death, appetite suppression/weight loss and other risks. For MALES- rare priapism. .     Controlled Medication Discussion:     Unable to review PDMP at this time. Psychotherapy Provided:     Individual psychotherapy provided: No     Treatment Plan:    Completed and signed during the session: Not applicable - Treatment Plan not due at this session    This note was shared with patient.     Visit Time    Visit Start Time: 8:30 AM  Visit Stop Time: 8:47 AM  Total Visit Duration: 17 minutes    Carroll Burt MD 09/08/23

## 2023-10-03 DIAGNOSIS — F90.0 ATTENTION DEFICIT HYPERACTIVITY DISORDER (ADHD), PREDOMINANTLY INATTENTIVE TYPE: ICD-10-CM

## 2023-10-04 RX ORDER — METHYLPHENIDATE HYDROCHLORIDE 54 MG/1
54 TABLET ORAL DAILY
Qty: 30 TABLET | Refills: 0
Start: 2023-10-05 | End: 2023-10-04 | Stop reason: SDUPTHER

## 2023-10-04 RX ORDER — METHYLPHENIDATE HYDROCHLORIDE 54 MG/1
54 TABLET ORAL DAILY
Qty: 30 TABLET | Refills: 0 | Status: SHIPPED | OUTPATIENT
Start: 2023-10-05

## 2023-11-06 DIAGNOSIS — F90.0 ATTENTION DEFICIT HYPERACTIVITY DISORDER (ADHD), PREDOMINANTLY INATTENTIVE TYPE: ICD-10-CM

## 2023-11-06 RX ORDER — METHYLPHENIDATE HYDROCHLORIDE 54 MG/1
54 TABLET ORAL DAILY
Qty: 30 TABLET | Refills: 0
Start: 2023-11-06 | End: 2023-11-07 | Stop reason: SDUPTHER

## 2023-11-07 RX ORDER — METHYLPHENIDATE HYDROCHLORIDE 54 MG/1
54 TABLET ORAL DAILY
Qty: 30 TABLET | Refills: 0 | Status: SHIPPED | OUTPATIENT
Start: 2023-11-07

## 2023-12-04 DIAGNOSIS — F90.0 ATTENTION DEFICIT HYPERACTIVITY DISORDER (ADHD), PREDOMINANTLY INATTENTIVE TYPE: ICD-10-CM

## 2023-12-05 RX ORDER — METHYLPHENIDATE HYDROCHLORIDE 54 MG/1
54 TABLET ORAL DAILY
Qty: 30 TABLET | Refills: 0 | Status: SHIPPED | OUTPATIENT
Start: 2023-12-05

## 2023-12-05 RX ORDER — METHYLPHENIDATE HYDROCHLORIDE 54 MG/1
54 TABLET ORAL DAILY
Qty: 30 TABLET | Refills: 0
Start: 2023-12-05 | End: 2023-12-05 | Stop reason: SDUPTHER

## 2023-12-11 ENCOUNTER — OFFICE VISIT (OUTPATIENT)
Dept: PSYCHIATRY | Facility: CLINIC | Age: 18
End: 2023-12-11

## 2023-12-11 DIAGNOSIS — F90.0 ATTENTION DEFICIT HYPERACTIVITY DISORDER (ADHD), PREDOMINANTLY INATTENTIVE TYPE: Primary | ICD-10-CM

## 2023-12-11 DIAGNOSIS — F42.4 EXCORIATION (SKIN-PICKING) DISORDER: ICD-10-CM

## 2023-12-11 PROBLEM — L84 CALLUS: Status: ACTIVE | Noted: 2023-12-11

## 2023-12-11 PROBLEM — L60.0 INGROWN NAIL: Status: RESOLVED | Noted: 2022-06-06 | Resolved: 2023-12-11

## 2023-12-11 PROBLEM — L84 CALLUS: Status: RESOLVED | Noted: 2023-12-11 | Resolved: 2023-12-11

## 2023-12-11 PROBLEM — M20.10 VALGUS DEFORMITY OF GREAT TOE: Status: ACTIVE | Noted: 2023-12-11

## 2023-12-11 PROBLEM — L60.0 INGROWN NAIL: Status: ACTIVE | Noted: 2022-06-06

## 2023-12-11 RX ORDER — METHYLPHENIDATE HYDROCHLORIDE 54 MG/1
54 TABLET ORAL DAILY
Qty: 30 TABLET | Refills: 0
Start: 2024-01-03 | End: 2023-12-11 | Stop reason: SDUPTHER

## 2023-12-11 RX ORDER — METHYLPHENIDATE HYDROCHLORIDE 54 MG/1
54 TABLET ORAL DAILY
Qty: 30 TABLET | Refills: 0 | Status: SHIPPED | OUTPATIENT
Start: 2024-01-03

## 2023-12-11 RX ORDER — METHYLPHENIDATE HYDROCHLORIDE 54 MG/1
54 TABLET ORAL DAILY
Qty: 30 TABLET | Refills: 0
Start: 2024-03-01 | End: 2023-12-11 | Stop reason: SDUPTHER

## 2023-12-11 RX ORDER — METHYLPHENIDATE HYDROCHLORIDE 54 MG/1
54 TABLET ORAL DAILY
Qty: 30 TABLET | Refills: 0 | Status: SHIPPED | OUTPATIENT
Start: 2024-03-01

## 2023-12-11 RX ORDER — METHYLPHENIDATE HYDROCHLORIDE 54 MG/1
54 TABLET ORAL DAILY
Qty: 30 TABLET | Refills: 0
Start: 2024-02-01 | End: 2023-12-11 | Stop reason: SDUPTHER

## 2023-12-11 RX ORDER — METHYLPHENIDATE HYDROCHLORIDE 54 MG/1
54 TABLET ORAL DAILY
Qty: 30 TABLET | Refills: 0 | Status: SHIPPED | OUTPATIENT
Start: 2024-02-01

## 2023-12-11 NOTE — PSYCH
MEDICATION MANAGEMENT NOTE        [facility] [address] [facility]      Name and Date of Birth:  Nisreen Hood 19 y.o. 2005 MRN: [id number]    Date of Visit: December 11, 2023    Reason for Visit: Follow-up visit regarding medication management     Chief Complaint: "I'm okay". SUBJECTIVE:    Nisreen Hood is a 19 y.o., white, male, domiciled with mother and father in [place], graduated from [employer] 2022, currently looking for employment,  possessing a previous psychiatric history significant for ADHD, anxiety, excoriations, no past psychiatric hosptalizations medically complicated by PNES vs Epilepsy, osteochondroma who was personally seen and evaluated at the [address] [place] [address] outpatient clinic for follow-up regarding medication management. Rafaela Jose states that since their previous outpatient psychiatric appointment with this writer, "I'm okay.". He reports he recently stopped work after falling off a roof at work, denies headstrike, LOC. He plans to apply to work at [employer]. He denies any symptoms of depression as well as anxiety, denies any recent excoriations. Discussed lifestyle modifications including eating healthier and being more,  Denies any further concerns at this time. Presently, patient denies suicidal/homicidal ideation in addition to thoughts of self-injury; contracts for safety, see below for risk assessment. At conclusion of evaluation, patient is amenable to the recommendations of this writer including: continue psychotropic medications as prescribed. Also, patient is amenable to calling/contacting the outpatient office including this writer if any acute adverse effects of their medication regimen arise in addition to any comments or concerns pertaining to their psychiatric management.   Patient is amenable to calling/contacting crisis and/or attending to the nearest emergency department if their clinical condition deteriorates to assure their safety and stability, stating that they are able to appropriately confide in their parents regarding their psychiatric state. Current Rating Scores:     Current PHQ-9   PHQ-2/9 Depression Screening    Little interest or pleasure in doing things: 0 - not at all  Feeling down, depressed, or hopeless: 0 - not at all  Trouble falling or staying asleep, or sleeping too much: 0 - not at all  Feeling tired or having little energy: 0 - not at all  Poor appetite or overeatin - not at all  Feeling bad about yourself - or that you are a failure or have let yourself or your family down: 1 - several days  Trouble concentrating on things, such as reading the newspaper or watching television: 0 - not at all  Moving or speaking so slowly that other people could have noticed. Or the opposite - being so fidgety or restless that you have been moving around a lot more than usual: 0 - not at all  Thoughts that you would be better off dead, or of hurting yourself in some way: 0 - not at all  PHQ-9 Score: 1   PHQ-9 Interpretation: No or Minimal depression          Current ROWDY-7 is   ROWDY-7 Flowsheet Screening      Flowsheet Row Most Recent Value   Over the last 2 weeks, how often have you been bothered by any of the following problems? Feeling nervous, anxious, or on edge 0   Not being able to stop or control worrying 0   Worrying too much about different things 0   Trouble relaxing 0   Being so restless that it is hard to sit still 0   Becoming easily annoyed or irritable 0   Feeling afraid as if something awful might happen 0   ROWDY-7 Total Score 0        . Psychiatric Review Of Systems:    Unchanged information from this writer's previous assessment is copied and italicized; information that has changed is bolded.     Appetite: some overeating, has been working on it  Adverse eating: denies currently  Weight changes: intentional weight loss  Insomnia/sleeplessness: no  Fatigue/anergy: no  Anhedonia/lack of interest: no  Attention/concentration: no  Psychomotor agitation/retardation: no  Somatic symptoms: no  Anxiety/panic attack: no  Katia/hypomania: no  Hopelessness/helplessness/worthlessness: no  Self-injurious behavior/high-risk behavior: not currently, history of excoriations (last episode about 2 years ago)  Suicidal ideation: no  Homicidal ideation: no  Auditory hallucinations: no  Visual hallucinations: no  Other perceptual disturbances: no  Delusional thinking: no  Obsessive/compulsive symptoms: no    Review Of Systems:      Constitutional negative   ENT negative   Cardiovascular negative   Respiratory negative   Gastrointestinal negative   Genitourinary negative   Musculoskeletal negative   Integumentary negative   Neurological negative   Endocrine negative   Other Symptoms none, all other systems are negative     History Review: The following portions of the patient's history were reviewed and updated as appropriate: allergies, current medications, past family history, past medical history, past social history, past surgical history, and problem list.    Unchanged information from this writer's previous assessment is copied and italicized; information that has changed is bolded. Family Psychiatric History:      Family History[]Expand by Default             Family History   Problem Relation Age of Onset    Asthma Mother      No Known Problems Father      Depression Maternal Grandfather        . Denies substance abuse or suicidality in immediate relations.       Past Psychiatric History:      Previous inpatient psychiatric admissions: denies  Previous intensive outpatient psychiatric services: denies  Previous inpatient/outpatient substance abuse rehabilitation: denies  Present/previous outpatient psychiatric services: Previously seeing Dr. Charlena Duverney since 2019  Present/previous psychotherapy services: Was in therapy for "anger management before 7th grade  History of suicidal attempts/gestures: denies  History of violence/aggressive behaviors: denies (got into one fight, more verbal)  Present/previous psychotropic medication use:   Vyvanse up to 60 mg (significant weight loss)  Adderall XR 20 mg (irritability)  Strattera (ineffective)  Intuniv 1mg (discontinued, as he no longer needed it)  Focalin XR 30 mg (ineffective)  Prozac up to 30 mg  Substance Abuse History:     Patient denies history of alcohol, illict substance, or tobacco abuse. Patient denies previous legal actions or arrests related to substance intoxication including prior DWIs/DUIs. Ameena Rogers does not apear under the influence or withdrawal of any psychoactive substance throughout today's examination. Social History:     Developmental: denies a history of milestone/developmental delay. Denies a history of in-utero exposure to toxins/illicit substances. Did have an IEP for Math or need for special education. Living arrangement: lives with mother and father, and pets  Academic history: graduated HS in 6/2022  Marital history: single  Social support system: friends, parent  Vocational History: currently looking for job position, interested in [employer], [employer]  Access to firearms: denies direct access to weapons/firearms. Shanell Mcugire has no history of arrests or violence pertaining to use of a deadly weapon. Traumatic History:      Abuse: denies  Other Traumatic Events: denies          OBJECTIVE:     Vital signs in last 24 hours: There were no vitals filed for this visit.     Mental Status Evaluation:    Appearance age appropriate, casually dressed, glasses, overweight   Behavior cooperative, calm   Speech normal rate, normal volume, normal pitch   Mood euthymic   Affect normal range and intensity, appropriate   Thought Processes organized, goal directed   Associations intact associations   Thought Content no overt delusions   Perceptual Disturbances: no auditory hallucinations, no visual hallucinations Abnormal Thoughts  Risk Potential Suicidal ideation - None  Homicidal ideation - None  Potential for aggression - No   Orientation oriented to person, place, time/date, and situation   Memory recent and remote memory grossly intact   Consciousness alert and awake   Attention Span Concentration Span attention span and concentration are age appropriate   Intellect appears to be of average intelligence   Insight fair   Judgement fair   Muscle Strength and  Gait normal muscle strength and normal muscle tone, normal gait and normal balance   Motor activity no abnormal movements   Language no difficulty naming common objects, no difficulty repeating a phrase, no difficulty writing a sentence   Fund of Knowledge adequate knowledge of current events  adequate fund of knowledge regarding past history  adequate fund of knowledge regarding vocabulary    Pain none   Pain Scale 0     Laboratory Results: I have personally reviewed all pertinent laboratory/tests results    Recent Labs (last 6 months):   No visits with results within 6 Month(s) from this visit. Latest known visit with results is:   Orders Only on 10/12/2021   Component Date Value    SARS-CoV-2 10/12/2021 Negative        Suicide/Homicide Risk Assessment:    Risk of Harm to Self:  The following ratings are based on assessment at the time of the interview  Demographic risk factors include: , male, age: young adult (15-24)  Historical Risk Factors include: history of anxiety, history of self-abusive behavior  Recent Specific Risk Factors include: none  Protective Factors: no current suicidal ideation  Weapons: none. The following steps have been taken to ensure weapons are properly secured: not applicable  Based on today's Winston Hallmark presents the following risk of harm to self: minimal    Risk of Harm to Others:   The following ratings are based on assessment at the time of the interview  Demographic Risk Factors include: male, 16-25 years of age.  Historical Risk Factors include: none. Recent Specific Risk Factors include: none. Protective Factors: no current homicidal ideation  Weapons: none. The following steps have been taken to ensure weapons are properly secured: not applicable  Based on today's Lynne Lelia presents the following risk of harm to others: minimal    The following interventions are recommended: no intervention changes needed. Although patient's acute lethality risk is low, long-term/chronic lethality risk is mildly elevated in the presence of see above. At the current moment, Neftali Minaya is future-oriented, forward-thinking, and demonstrates ability to act in a self-preserving manner as evidenced by volitionally presenting to the clinic today, seeking treatment. To mitigate future risk, patient should adhere to the recommendations of this writer, avoid alcohol/illicit substance use, utilize community-based resources and familiar support and prioritize mental health treatment. Based on today's assessment and clinical criteria, Bunny Heaton Cap contracts for safety and is not an imminent risk of harm to self or others. Outpatient level of care is deemed appropriate at this present time. Neftali Minaya understands that if they are no longer able to contract for safety, they need to call/contact the outpatient office including this writer, call/contact crisis and/orattend to the nearest Emergency Department for immediate evaluation. Assessment/Plan:     Neftali Minaya was personally seen and evaluated today at the [address] [place] [address] outpatient clinic  for follow-up regarding medication management. As of depression, anxiety, self excoriations. Reports that symptoms of ADHD are managed with current medications. Denies any side effects to medications. Denies any suicidal ideations, auditory and visual hallucinations.   Patient is agreeable to lifestyle modifications including eating healthier and increased exercise as approved by PCP.  DSM-5 Diagnoses:     . ADHD, predominantly inattentive type managed on current medications  Excoriations, history of, 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    Treatment Recommendations/Precautions:  Continue the following medications:  Concerta ER 54 mg daily for symptoms of ADHD  Prozac 30 mg daily for excoriation  Consider transfer of psychiatric treatment to PCP  Medication management every 4 months  Aware of 24 hour and weekend coverage for urgent situations accessed by calling [facility] main practice number    Medications Risks/Benefits hello    Risks, Benefits And Possible Side Effects Of Medications:    Risks, benefits, and possible side effects of medications explained to Bunny and he verbalizes understanding and agreement for treatment. including: .jj. Controlled Medication Discussion:     Adriel Villarreal has been filling controlled prescriptions on time as prescribed according to [address] [facility] Monitoring Program    Psychotherapy Provided:     Individual psychotherapy provided: No     Treatment Plan:    Completed and signed during the session: Yes - Treatment Plan done but not signed at time of office visit due to:  Plan reviewed in person and verbal consent   This note was shared with patient.     Visit Time    Visit Start Time: 09:57 AM  Visit Stop Time: 10:17 PM  Total Visit Duration:  20 minutes    Anthony Benitez MD 12/11/23

## 2023-12-11 NOTE — BH TREATMENT PLAN
TREATMENT PLAN (Medication Management Only)        [address] [facility]    Name and Date of Birth:  Hipolito Louis 19 y.o. 2005  Date of Treatment Plan: December 11, 2023  Diagnosis/Diagnoses:    1. Attention deficit hyperactivity disorder (ADHD), predominantly inattentive type    2. Excoriation (skin-picking) disorder      Strengths/Personal Resources for Self-Care: supportive family, supportive friends, taking medications as prescribed, ability to adapt to life changes, ability to communicate needs, ability to communicate well. Area/Areas of need (in own words): ADHD symptoms  1. Long Term Goal: maintain control of ADHD symptoms. Target Date:6 months - 6/11/2024  Person/Persons responsible for completion of goal: Bunny  2. Short Term Objective (s) - How will we reach this goal?:   A. Provider new recommended medication/dosage changes and/or continue medication(s): continue current medications as prescribed. B. Exercise regularly . C. Eat a healthy diet . Target Date:6 months - 6/11/2024  Person/Persons Responsible for Completion of Goal: Bunny  Progress Towards Goals: starting treatment  Treatment Modality: medication management every 4 months  Review due 180 days from date of this plan: 6 months - 6/11/2024  Expected length of service: maintenance  My Physician/PA/NP and I have developed this plan together and I agree to work on the goals and objectives. I understand the treatment goals that were developed for my treatment.

## 2024-02-05 DIAGNOSIS — F42.4 EXCORIATION (SKIN-PICKING) DISORDER: ICD-10-CM

## 2024-02-06 RX ORDER — FLUOXETINE 10 MG/1
CAPSULE ORAL
Qty: 90 CAPSULE | Refills: 0 | Status: SHIPPED | OUTPATIENT
Start: 2024-02-06

## 2024-02-06 RX ORDER — FLUOXETINE HYDROCHLORIDE 20 MG/1
20 CAPSULE ORAL DAILY
Qty: 90 CAPSULE | Refills: 0 | Status: SHIPPED | OUTPATIENT
Start: 2024-02-06

## 2024-04-03 DIAGNOSIS — F42.4 EXCORIATION (SKIN-PICKING) DISORDER: ICD-10-CM

## 2024-04-03 DIAGNOSIS — F90.0 ATTENTION DEFICIT HYPERACTIVITY DISORDER (ADHD), PREDOMINANTLY INATTENTIVE TYPE: ICD-10-CM

## 2024-04-03 RX ORDER — FLUOXETINE 10 MG/1
CAPSULE ORAL
Qty: 90 CAPSULE | Refills: 0 | Status: CANCELLED | OUTPATIENT
Start: 2024-04-03

## 2024-04-03 RX ORDER — FLUOXETINE 10 MG/1
CAPSULE ORAL
Qty: 90 CAPSULE | Refills: 0 | Status: SHIPPED | OUTPATIENT
Start: 2024-04-03

## 2024-04-03 RX ORDER — METHYLPHENIDATE HYDROCHLORIDE 54 MG/1
54 TABLET ORAL DAILY
Qty: 30 TABLET | Refills: 0 | Status: SHIPPED | OUTPATIENT
Start: 2024-04-03

## 2024-04-03 RX ORDER — FLUOXETINE HYDROCHLORIDE 20 MG/1
20 CAPSULE ORAL DAILY
Qty: 90 CAPSULE | Refills: 0 | Status: SHIPPED | OUTPATIENT
Start: 2024-04-03

## 2024-04-03 RX ORDER — FLUOXETINE HYDROCHLORIDE 20 MG/1
20 CAPSULE ORAL DAILY
Qty: 90 CAPSULE | Refills: 0 | Status: CANCELLED | OUTPATIENT
Start: 2024-04-03

## 2024-04-03 RX ORDER — METHYLPHENIDATE HYDROCHLORIDE 54 MG/1
54 TABLET ORAL DAILY
Qty: 30 TABLET | Refills: 0 | Status: CANCELLED | OUTPATIENT
Start: 2024-04-03

## 2024-04-04 NOTE — TELEPHONE ENCOUNTER
Covering for Dr. Duran Carrillo in their absence. Refill request for methylphenidate 54 mg ER tablet, fluoxetine 20 mg capsule, fluoxetine 10 mg capsule. Chart reviewed, most recent office visit note reviewed. Medication refill is appropriate. Will proceed with the medication refill.     Controlled Substance Review    [place] PDMP or [place]  reviewed: No red flags were identified; safe to proceed with prescription.    PDMP Review         Value Time User    PDMP Reviewed  Yes 4/3/2024  9:02 PM Armand Lundberg MD

## 2024-05-03 ENCOUNTER — OFFICE VISIT (OUTPATIENT)
Dept: PSYCHIATRY | Facility: CLINIC | Age: 19
End: 2024-05-03

## 2024-05-03 DIAGNOSIS — F42.4 EXCORIATION (SKIN-PICKING) DISORDER: ICD-10-CM

## 2024-05-03 DIAGNOSIS — F90.0 ATTENTION DEFICIT HYPERACTIVITY DISORDER (ADHD), PREDOMINANTLY INATTENTIVE TYPE: Primary | ICD-10-CM

## 2024-05-03 PROCEDURE — NC001 PR NO CHARGE: Performed by: STUDENT IN AN ORGANIZED HEALTH CARE EDUCATION/TRAINING PROGRAM

## 2024-05-03 RX ORDER — METHYLPHENIDATE HYDROCHLORIDE 54 MG/1
54 TABLET ORAL DAILY
Qty: 30 TABLET | Refills: 0 | Status: SHIPPED | OUTPATIENT
Start: 2024-05-03

## 2024-05-03 RX ORDER — METHYLPHENIDATE HYDROCHLORIDE 54 MG/1
54 TABLET ORAL DAILY
Start: 2024-07-01 | End: 2024-05-03 | Stop reason: SDUPTHER

## 2024-05-03 RX ORDER — METHYLPHENIDATE HYDROCHLORIDE 54 MG/1
54 TABLET ORAL DAILY
Start: 2024-06-02 | End: 2024-05-03 | Stop reason: SDUPTHER

## 2024-05-03 RX ORDER — FLUOXETINE 10 MG/1
CAPSULE ORAL
Qty: 90 CAPSULE | Refills: 0 | Status: SHIPPED | OUTPATIENT
Start: 2024-05-03

## 2024-05-03 RX ORDER — METHYLPHENIDATE HYDROCHLORIDE 54 MG/1
54 TABLET ORAL DAILY
Start: 2024-05-03 | End: 2024-05-03 | Stop reason: SDUPTHER

## 2024-05-03 RX ORDER — METHYLPHENIDATE HYDROCHLORIDE 54 MG/1
54 TABLET ORAL DAILY
Qty: 30 TABLET | Refills: 0 | Status: SHIPPED | OUTPATIENT
Start: 2024-07-01

## 2024-05-03 RX ORDER — METHYLPHENIDATE HYDROCHLORIDE 54 MG/1
54 TABLET ORAL DAILY
Qty: 30 TABLET | Refills: 0 | Status: SHIPPED | OUTPATIENT
Start: 2024-06-02

## 2024-05-03 RX ORDER — FLUOXETINE HYDROCHLORIDE 20 MG/1
20 CAPSULE ORAL DAILY
Qty: 90 CAPSULE | Refills: 0 | Status: SHIPPED | OUTPATIENT
Start: 2024-05-03

## 2024-05-03 NOTE — BH CRISIS PLAN
Client Name: Bunny Pandey       Client YOB: 2005    [facility] Safety Plan      Creation Date: 5/3/24 Update Date: 5/3/24   Created By: Duran Carrillo MD Last Updated By: Duran Carrillo MD      Step 1: Warning Signs:   Warning Signs   more sad            Step 2: Internal Coping Strategies:   Internal Coping Strategies   play video games   build a model            Step 3: People and social settings that provide distraction:   Name Contact Information   mother Cony) [phone]            Step 4: People whom I can ask for help during a crisis:      Name Contact Information    mother (lauren) [phone]      Step 5: Professionals or agencies I can contact during a crisis:      Clinican/Agency Name Phone Emergency Contact    police dept 262         Crisis Phone Numbers:   Suicide Prevention Lifeline: Call or Text  019 Crisis Text Line: Text HOME to 772-414   Please note: Some [place] do not have a separate number for Child/Adolescent specific crisis. If your county is not listed under Child/Adolescent, please call the adult number for your county      Adult Crisis Numbers: Child/Adolescent Crisis Numbers   [facility]: [phone] [facility]: [phone]   [facility]: [phone] [facility]: [phone]   [facility]: [phone] [place]: [phone]   [facility]: [phone] [facility]: [phone]   [facility]: [phone]   [facility]: [phone]   [facility]: [phone]   [facility]: [phone] (daytime) [phone] (after hours, weekends, holidays)      Step 6: Making the environment safer (plan for lethal means safety):   Plan: Parents will remove sharp objects     Optional: What is most important to me and worth living for?   Family, friends     [facility] Safety Plan. Jayne Hallman and Saúl Griffin. Used with permission of the authors.

## 2024-05-03 NOTE — PSYCH
"MEDICATION MANAGEMENT NOTE        [facility]      Name and Date of Birth:  Bunny Pandey 18 y.o. 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 Bunny states that since their previous outpatient psychiatric appointment with this writer, he reports his symptoms of ADHD, anxiety and intentional self-injurious behaviors are managed with current medications.       Denies any recent self-inflicted excoriations, \"years,\" Denies any seizure activities since at least last appt (6 months ago).  Taking classes for [employer] certifications staring May 9 for 2 days a week.     Denies depression,  anxiety, intentional self injurious behaviors, symptoms of ADHD are managed     He did express some disappointment that this is his last appointment with this writer.      Presently, patient denies suicidal/homicidal ideation in addition to thoughts of self-injury; contracts for safety, see below for risk assessment.  At conclusion of evaluation, patient is amenable to the recommendations of this writer including: continue psychotropic medications as prescribed.  Also, patient is amenable to calling/contacting the outpatient office including this writer if any acute adverse effects of their medication regimen arise in addition to any comments or concerns " pertaining to their psychiatric management.  Patient is amenable to calling/contacting crisis and/or attending to the nearest emergency department if their clinical condition deteriorates to assure their safety and stability, stating that they are able to appropriately confide in their family regarding their psychiatric state.    Current Rating Scores:     Current PHQ-9   PHQ-2/9 Depression Screening    Little interest or pleasure in doing things: 0 - not at all  Feeling down, depressed, or hopeless: 0 - not at all  Trouble falling or staying asleep, or sleeping too much: 1 - several days  Feeling tired or having little energy: 0 - not at all  Poor appetite or overeatin - not at all  Feeling bad about yourself - or that you are a failure or have let yourself or your family down: 0 - not at all  Trouble concentrating on things, such as reading the newspaper or watching television: 0 - not at all  Moving or speaking so slowly that other people could have noticed. Or the opposite - being so fidgety or restless that you have been moving around a lot more than usual: 0 - not at all  Thoughts that you would be better off dead, or of hurting yourself in some way: 0 - not at all  PHQ-9 Score: 1  PHQ-9 Interpretation: No or Minimal depression       Current ROWDY-7 is   ROWDY-7 Flowsheet Screening      Flowsheet Row Most Recent Value   Over the last 2 weeks, how often have you been bothered by any of the following problems?    Feeling nervous, anxious, or on edge 0   Not being able to stop or control worrying 0   Worrying too much about different things 0   Trouble relaxing 0   Being so restless that it is hard to sit still 0   Becoming easily annoyed or irritable 0   Feeling afraid as if something awful might happen 0   ROWDY-7 Total Score 0        .    Psychiatric Review Of Systems:    Unchanged information from this writer's previous assessment is copied and italicized; information that has changed is bolded.    Appetite:  some overeating, has been working on it  Adverse eating: denies currently  Weight changes: intentional weight loss  Insomnia/sleeplessness: no  Fatigue/anergy: no  Anhedonia/lack of interest: no  Attention/concentration: no  Psychomotor agitation/retardation: no  Somatic symptoms: no  Anxiety/panic attack: no  Katia/hypomania: no  Hopelessness/helplessness/worthlessness: no  Self-injurious behavior/high-risk behavior: not currently, history of excoriations (last episode about 2 years ago)  Suicidal ideation: no  Homicidal ideation: no  Auditory hallucinations: no  Visual hallucinations: no  Other perceptual disturbances: no  Delusional thinking: no  Obsessive/compulsive symptoms: no    Review Of Systems:      Constitutional negative   ENT negative   Cardiovascular negative   Respiratory negative   Gastrointestinal negative   Genitourinary negative   Musculoskeletal negative   Integumentary negative   Neurological negative   Endocrine negative   Other Symptoms none, all other systems are negative     History Review: The following portions of the patient's history were reviewed and updated as appropriate: allergies, current medications, past family history, past medical history, past social history, past surgical history, and problem list.    Unchanged information from this writer's previous assessment is copied and italicized; information that has changed is bolded.    Family Psychiatric History:      Family History[]Expand by Default             Family History   Problem Relation Age of Onset    Asthma Mother      No Known Problems Father      Depression Maternal Grandfather        .  Denies substance abuse or suicidality in immediate relations.      Past Psychiatric History:      Previous inpatient psychiatric admissions: denies  Previous intensive outpatient psychiatric services: denies  Previous inpatient/outpatient substance abuse rehabilitation: denies  Present/previous outpatient psychiatric services:  "Previously seeing Dr. Huy Morel since 2019  Present/previous psychotherapy services: Was in therapy for \"anger management before 7th grade  History of suicidal attempts/gestures: denies  History of violence/aggressive behaviors: denies (got into one fight, more verbal)  Present/previous psychotropic medication use:   Vyvanse up to 60 mg (significant weight loss)  Adderall XR 20 mg (irritability)  Strattera (ineffective)  Intuniv 1mg (discontinued, as he no longer needed it)  Focalin XR 30 mg (ineffective)  Prozac up to 30 mg  Substance Abuse History:     Patient denies history of alcohol, illict substance, or tobacco abuse. Patient denies previous legal actions or arrests related to substance intoxication including prior DWIs/DUIs. Bunny does not apear under the influence or withdrawal of any psychoactive substance throughout today's examination.      Social History:     Developmental: denies a history of milestone/developmental delay. Denies a history of in-utero exposure to toxins/illicit substances. Did have an IEP for Math or need for special education.  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 Bunny CALEB Pandey has no history of arrests or violence pertaining to use of a deadly weapon.      Traumatic History:      Abuse: denies  Other Traumatic Events: denies       OBJECTIVE:     Vital signs in last 24 hours:    There were no vitals filed for this visit.    Mental Status Evaluation:    Appearance age appropriate, casually dressed, overweight, glasses   Behavior pleasant, cooperative, calm, seems somewhat younger than stated age   Speech normal rate, normal volume, normal pitch   Mood euthymic   Affect normal range and intensity, " appropriate   Thought Processes organized, goal directed   Associations intact associations   Thought Content no overt delusions   Perceptual Disturbances: no auditory hallucinations, no visual hallucinations   Abnormal Thoughts  Risk Potential Suicidal ideation - None at present  Homicidal ideation - None  Potential for aggression - No   Orientation oriented to person, place, and situation   Memory recent and remote memory grossly intact   Consciousness alert and awake   Attention Span Concentration Span attention span and concentration appear shorter than expected for age   Intellect appears to be of average intelligence   Insight fair   Judgement intact   Muscle Strength and  Gait normal muscle strength and normal muscle tone, normal gait and normal balance   Motor activity no abnormal movements   Language no difficulty naming common objects, no difficulty repeating a phrase, no difficulty writing a sentence   Fund of Knowledge adequate knowledge of current events  adequate fund of knowledge regarding past history  adequate fund of knowledge regarding vocabulary    Pain none   Pain Scale 0     Laboratory Results: I have personally reviewed all pertinent laboratory/tests results    Recent Labs (last 6 months):   No visits with results within 6 Month(s) from this visit.   Latest known visit with results is:   Orders Only on 10/12/2021   Component Date Value    SARS-CoV-2 10/12/2021 Negative        Suicide/Homicide Risk Assessment:    Risk of Harm to Self:  The following ratings are based on assessment at the time of the interview  Demographic risk factors include: , male, age: young adult (15-24)  Historical Risk Factors include: history of anxiety, history of self-abusive behavior  Recent Specific Risk Factors include: none  Protective Factors: no current suicidal ideation  Weapons: none. The following steps have been taken to ensure weapons are properly secured: not applicable  Based on today's  assessment, Bunny presents the following risk of harm to self: minimal    Risk of Harm to Others:  The following ratings are based on assessment at the time of the interview  Demographic Risk Factors include: male, 16-25 years of age.  Historical Risk Factors include: none.  Recent Specific Risk Factors include: none.  Protective Factors: no current homicidal ideation  Weapons: none. The following steps have been taken to ensure weapons are properly secured: not applicable  Based on today's assessment, Bunny presents the following risk of harm to others: minimal    The following interventions are recommended: no intervention changes needed. Although patient's acute lethality risk is low, long-term/chronic lethality risk is mildly elevated in the presence of see above. At the current moment, Bunny is future-oriented, forward-thinking, and demonstrates ability to act in a self-preserving manner as evidenced by volitionally presenting to the clinic today, seeking treatment. To mitigate future risk, patient should adhere to the recommendations of this writer, avoid alcohol/illicit substance use, utilize community-based resources and familiar support and prioritize mental health treatment.     Based on today's assessment and clinical criteria, Bunny Pandey contracts for safety and is not an imminent risk of harm to self or others. Outpatient level of care is deemed appropriate at this present time. Bunny understands that if they are no longer able to contract for safety, they need to call/contact the outpatient office including this writer, call/contact crisis and/orattend to the nearest Emergency Department for immediate evaluation.    Assessment/Plan:     Bunny was personally seen and evaluated today at the [facility] outpatient clinic for follow-up regarding medication management. His symptoms of ADHD, self-inflicted excoriations, and anxiety are managed with current medications. Denies  depression, SI, HI/AVH. Denies side effects to medications. Is starting a certification class for [employer] in order to start working. Patient has a history of PNES vs seizures, no seizure-like activity in 6+ months.    DSM-5 Diagnoses:     ADHD, predominantly inattentive type managed on current medications  Excoriations, history of, 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  Anxiety, unspecified, in remission    Treatment Recommendations/Precautions:  Continue the following:   Concerta ER 54 mg daily for symptoms of ADHD  Prozac 30 mg daily for excoriations and management of symptoms of anxiety  Avoid medications that decrease seizure threshold due to history of seizure-like activities  Transfer of Care, resident clinic, no preference, medications sent for 3 months  Aware of 24 hour and weekend coverage for urgent situations accessed by calling [facility] main practice number    Medications Risks/Benefits      Risks, Benefits And Possible Side Effects Of Medications:    Risks, benefits, and possible side effects of medications explained to Bunny and he verbalizes understanding and agreement for treatment. including: PARQ completed including serotonin syndrome, SIADH, worsening depression, suicidality, induction of ghassan, GI upset, headaches, activation, sexual side effects, sedation, potential drug interactions, and others. PARQ completed including elevated heart rate, elevated bp, seizures, anxiety/irritability, activation/induction of ghassan, abuse potential, interactions with other medications, risk of sudden death, appetite suppression/weight loss and other risks. For MALES- rare priapism. .     Controlled Medication Discussion:     Bunny has been filling controlled prescriptions on time as prescribed according to [place] Prescription Drug Monitoring Program    Psychotherapy Provided:     Individual psychotherapy provided: Crisis/safety plan discussed with Bunny.     Treatment  Plan:    Completed and signed during the session: Not applicable - Treatment Plan not due at this session    This note was shared with patient.    Visit Time    Visit Start Time: 9:20 AM  Visit Stop Time: 9:40 AM  Total Visit Duration:  20 minutes    Duran Carrillo MD 05/03/24

## 2024-06-27 ENCOUNTER — TELEPHONE (OUTPATIENT)
Dept: PSYCHIATRY | Facility: CLINIC | Age: 19
End: 2024-06-27

## 2024-06-27 NOTE — TELEPHONE ENCOUNTER
Contacted patient off of [facility]  to verify needs of services in attempts to offer patient an appointment at [facility]. LVM for patient to contact intake dept  in regards to an appointment.

## 2024-07-23 ENCOUNTER — OFFICE VISIT (OUTPATIENT)
Dept: PSYCHIATRY | Facility: CLINIC | Age: 19
End: 2024-07-23
Payer: COMMERCIAL

## 2024-07-23 VITALS
BODY MASS INDEX: 36.9 KG/M2 | DIASTOLIC BLOOD PRESSURE: 84 MMHG | SYSTOLIC BLOOD PRESSURE: 131 MMHG | WEIGHT: 215 LBS | HEART RATE: 87 BPM

## 2024-07-23 DIAGNOSIS — F90.9 ATTENTION DEFICIT HYPERACTIVITY DISORDER (ADHD), UNSPECIFIED ADHD TYPE: Primary | ICD-10-CM

## 2024-07-23 DIAGNOSIS — F41.9 ANXIETY DISORDER, UNSPECIFIED TYPE: ICD-10-CM

## 2024-07-23 PROCEDURE — 90792 PSYCH DIAG EVAL W/MED SRVCS: CPT

## 2024-07-23 RX ORDER — FLUOXETINE 10 MG/1
CAPSULE ORAL
Qty: 90 CAPSULE | Refills: 0 | Status: SHIPPED | OUTPATIENT
Start: 2024-07-23

## 2024-07-23 RX ORDER — FLUOXETINE HYDROCHLORIDE 20 MG/1
20 CAPSULE ORAL DAILY
Qty: 90 CAPSULE | Refills: 0 | Status: SHIPPED | OUTPATIENT
Start: 2024-07-23

## 2024-07-23 RX ORDER — METHYLPHENIDATE HYDROCHLORIDE 54 MG/1
54 TABLET ORAL DAILY
Qty: 90 TABLET | Refills: 0 | Status: SHIPPED | OUTPATIENT
Start: 2024-07-23

## 2024-07-23 NOTE — BH TREATMENT PLAN
TREATMENT PLAN - Medication Management        [facility]    Name and Date of Birth:  Bunny Pandey 19 y.o. 2005  Date of Treatment Plan: July 23, 2024  Diagnosis/Diagnoses:    1. Attention deficit hyperactivity disorder (ADHD), unspecified ADHD type        Strengths/Personal Resources for Self-Care: supportive family, supportive friends, taking medications as prescribed, special hobby/interest    Area/Areas of need: anxiety symptoms, attention and concentration problems    Long Term Goal: maintain stability of attention  Target Date: 6 months - January 23, 2025  Person/Persons responsible for completion of goal: Bunny and Balaji Egan MD     Short Term Objective (s) - How will we reach this goal?:   Take medications as prescribed  Attend psychiatry appointments regularly  Follow up with medical providers  Target Date: 6 months - January 23, 2025  Person/Persons Responsible for Completion of Goal: Bunny     Progress Towards Goals: Continuing treatment    Treatment Modality: medication management every 3 months or sooner if needed, consider starting psychotherapy in the future, and follow up with PCP  Review due 180 days from date of this plan: January 19, 2025   Expected length of service: Ongoing treatment    My physician and I have developed this plan together, and I agree to work on the goals and objectives. I understand the treatment goals that were developed for my treatment.    The treatment plan was created between Balaji Egan MD and Bunny Pandey on 07/23/24 at 9:31 AM.

## 2024-07-23 NOTE — LETTER
July 23, 2024     Patient: Bunny Pandey  YOB: 2005  Date of Visit: 7/23/2024      To Whom it May Concern:    Bunny Pandey has been under the professional care of this clinic since 2019. Bunny was seen in my office on 7/23/2024. Bunny is prescribed methylphenidate as part of this care.         Sincerely,          Balaji Egan MD  Psychiatry

## 2024-07-23 NOTE — PSYCH
" PSYCHIATRIC EVALUATION     [facility] - [facility]    Name and Date of Birth:  Bunny Pandey 19 y.o. 2005 MRN: [id number]    Date of Visit: July 23, 2024    Reason for visit: Full psychiatric intake assessment for medication management     Chief Complaint: \"mental health stuff\"    HPI     Bunny Pandey is 19-years-old living with parents, not currently in school and currently unemployed however has upcoming interview, and no past hospitalizations. He reports stress and anxiety was his initial cause for presentation and needing to develop coping mechanisms. He expressed picking at skin behavior as a symptom. He reports stress experienced in crowds, overwhelmed with people, that did not however prohibit him from attending wanted events. At its worse he reports it was not often. He thinks it may be his parents that have pushed him \"to be more social,\" however they have never said this. He does not think the anxiety was ever a big concern for him. He does not engage in the picking behavior as much, maybe once/month, and has been better the past three years. He has been on fluoxetine for at least three years and attributes his improvement to this. Overall he has found the anxiety has improved over the past three years. He reports mood as \"fine.\"    He takes Concerta for \"ADHD?\" But then elaborates that he is \"off the walls\", fidgeting, unable to sit still or function. He does not know about Concerta, however has been on some medication for this behaviour since , and Concerta possibly since eighth grade. Couple months ago he had two days without medication and found that he was fidgety. It was difficult also to \"hold a conversation\" that was \"chel\" different from how he normally is.            Psychiatric Review Of Systems:    Appetite: good  Adverse eating: no  Weight changes: no  Insomnia/sleeplessness: sleep is good  Fatigue/anergy: no  Anhedonia/lack of " "interest: no  Attention/concentration: variable  Psychomotor agitation/retardation: no  Somatic symptoms: no  Anxiety/panic attack: rare  Katia/hypomania: no  Hopelessness/helplessness/worthlessness: no  Self-injurious behavior/high-risk behavior: denies  Suicidal ideation: denies  Homicidal ideation: denies  Auditory hallucinations: denies  Visual hallucinations: denies  Delusional thinking: no  Obsessive/compulsive symptoms: denies    Review Of Systems:    Constitutional negative   ENT negative   Cardiovascular negative   Respiratory negative   Gastrointestinal negative   Genitourinary negative   Musculoskeletal negative   Integumentary negative   Neurological negative   Endocrine negative   Other Symptoms none, all other systems are negative     Unchanged information from this writer's previous assessment is copied and italicized; information that has changed is bolded.     Past Psychiatric History:     Previous inpatient psychiatric admissions: denies  Previous intensive outpatient psychiatric services: denies  Previous inpatient/outpatient substance abuse rehabilitation: denies  Present/previous outpatient psychiatric services: Previously seeing Dr. Huy Morel since 2019, current  Present/previous psychotherapy services: Was in therapy for \"anger management before 7th grade  History of suicidal attempts/gestures: denies  History of violence/aggressive behaviors: denies   Present/previous psychotropic medication use:   Vyvanse up to 60 mg (significant weight loss)  Adderall XR 20 mg (irritability)  Strattera (ineffective)  Intuniv 1mg (discontinued, as he no longer needed it)  Focalin XR 30 mg (ineffective)  Prozac up to 30 mg    Substance Abuse History:    EtOH 8-oz beer 1-2X/week  Denies nicotine use  Denies cannabis use  Denies caffeine use  Denies all other substances    Family Psychiatric History:     Family History   Problem Relation Age of Onset    Asthma Mother     No Known Problems Father     " Depression Maternal Grandfather            Social History:    Developmental: denies a history of milestone/developmental delay. Denies a history of in-utero exposure to toxins/illicit substances. Did have an IEP for Math or need for special education.  Living arrangement: lives with mother and father, and pets  Academic history: graduated HS in 6/2022  Marital history: single  Social support system: friends, parent  Vocational History: unemployed, has upcoming interview for a warehouse  Access to firearms: denies direct access to weapons/firearms. Bunny Pandey has no history of arrests or violence pertaining to use of a deadly weapon.     Traumatic History:     Denies    Past Medical History:    Past Medical History:   Diagnosis Date    Anxiety     Callus 12/11/2023    Depression     Excoriation (skin-picking) disorder 09/13/2019    Ingrown nail 06/06/2022    Obesity         Past Surgical History:   Procedure Laterality Date    LEG SURGERY      Osteochondroma     Allergies   Allergen Reactions    Acyclovir Other (See Comments)    Penicillins     Shellfish-Derived Products - Food Allergy     Shellfish-Derived Products - Food Allergy Other (See Comments)     Tested positive for allergy    Penicillins Rash    Tomato - Food Allergy Rash       History Review:    The following portions of the patient's history were reviewed and updated as appropriate: allergies, current medications, past family history, past medical history, past social history, past surgical history, and problem list.    OBJECTIVE:    Vital signs in last 24 hours:    Vitals:    07/23/24 0903   BP: 131/84   Pulse: 87   Weight: 97.5 kg (215 lb)       Mental Status Evaluation:    Appearance Overweight, unkept light-colored hair, glasses, thin facial hair, short sleeved shirt and shorts and sandals  Fair eye contact   Behavior Appeared mostly uninterested in interview, playing a game on cell phone  Seated calmly   Speech Normal rate and volume   Mood  "\"Fine\"   Affect Euthymic, full range   Thought Processes Linear and logical   Thought Content No overt delusions, denies hallucinations, denies suicidal ideations   Cognition Awake and alert  Oriented and memory grossly intact  Attends when needed   Insight Moderate to fair   Judgement Fair       Laboratory Results: I have personally reviewed all pertinent laboratory/tests results    No visits with results within 6 Month(s) from this visit.   Latest known visit with results is:   Orders Only on 10/12/2021   Component Date Value Ref Range Status    SARS-CoV-2 10/12/2021 Negative  Negative Final       Suicide/Homicide Risk Assessment:    Risk of Harm to Self:  The following ratings are based on assessment at the time of the interview and review of records  Demographic risk factors include: male, age: young adult (15-24)  Historical Risk Factors include: history of anxiety  Recent Specific Risk Factors include: none  Protective Factors: no current suicidal ideation, compliant with medications, having a desire to live, stable living environment, supportive family  Weapons: none. The following steps have been taken to ensure weapons are properly secured: not applicable  Based on today's assessment, Bunny presents the following risk of harm to self: minimal    Risk of Harm to Others:  The following ratings are based on assessment at the time of the interview and review of records  Demographic Risk Factors include: male, 16-25 years of age.  Historical Risk Factors include: none.  Recent Specific Risk Factors include: none.  Protective Factors: no current homicidal ideation, compliant with medications, having a desire to live, stable living environment, supportive family  Weapons: none. The following steps have been taken to ensure weapons are properly secured: not applicable  Based on today's assessment, Bunny presents the following risk of harm to others: none          Assessment/Plan:   Bunny Katherin is 19-years-old " living with parents, not currently in school and currently unemployed however has upcoming interview, and no past hospitalizations.  Patient provides limited history due to some vagueness.  He reports history of about 3 years ago of more significant anxiety that seemed mostly social however may have been generalized, that has improved and he reports no anxiety symptoms at this time.  At that time he also reported skin picking that he now only engages in maybe once monthly.  He also reports history of apparent attention/hyperactivity symptoms since  that has been well-controlled with methylphenidate.  In future may consider reduction of dose of fluoxetine given his improvement, as well as trialing some time without methylphenidate to determine its current usefulness to him.    Reviewed record of apparent seizure-like activity.  According to documentation by neurologist, the patient had an episode in 2016 and 2017 that were epileptic versus convulsive syncopal; an EEG and MRI at the time were normal and he was determined not to need antiepileptic medication.  He has had no further episodes since.    DSM-5 Diagnoses:     1. Attention deficit hyperactivity disorder (ADHD), unspecified ADHD type    2. Anxiety disorder, unspecified type        Treatment Recommendations/Precautions:  Continue fluoxetine 30 mg daily for anxiety  Continue methylphenidate ER 54 mg every morning for inattentiveness/hyperactivity  Risks/benefits/alternatives to treatment discussed, including a myriad of potential adverse medication side effects, to which Bunny voiced understanding and consented fully to treatment.   Medication management follow-up in 3 months  Aware of 24 hour and weekend coverage for urgent situations accessed by calling [facility] main practice number      Controlled Medication Discussion:     Bunny has been filling controlled prescriptions on time as prescribed according to [place]  Prescription Drug Monitoring Program    Treatment Plan:    Completed and signed during the session: Yes - with Bunny      Visit Time    Visit Start Time: 0900  Visit Stop Time: 1000  Total Visit Duration: 60 minutes    Balaji Egan MD   07/23/24

## 2024-10-02 ENCOUNTER — OFFICE VISIT (OUTPATIENT)
Dept: FAMILY MEDICINE CLINIC | Facility: CLINIC | Age: 19
End: 2024-10-02
Payer: COMMERCIAL

## 2024-10-02 VITALS
OXYGEN SATURATION: 97 % | DIASTOLIC BLOOD PRESSURE: 72 MMHG | HEART RATE: 100 BPM | SYSTOLIC BLOOD PRESSURE: 132 MMHG | TEMPERATURE: 97.8 F | WEIGHT: 213.6 LBS | BODY MASS INDEX: 36.66 KG/M2

## 2024-10-02 DIAGNOSIS — B34.9 VIRAL INFECTION: Primary | ICD-10-CM

## 2024-10-02 DIAGNOSIS — J02.9 SORE THROAT: ICD-10-CM

## 2024-10-02 PROBLEM — R56.9 SEIZURE-LIKE ACTIVITY (HCC): Status: RESOLVED | Noted: 2017-12-29 | Resolved: 2024-10-02

## 2024-10-02 PROCEDURE — 99213 OFFICE O/P EST LOW 20 MIN: CPT | Performed by: FAMILY MEDICINE

## 2024-10-02 RX ORDER — METHYLPREDNISOLONE 4 MG
TABLET, DOSE PACK ORAL
Qty: 21 EACH | Refills: 0 | Status: SHIPPED | OUTPATIENT
Start: 2024-10-02

## 2024-10-02 RX ORDER — DOXYCYCLINE HYCLATE 100 MG
100 TABLET ORAL 2 TIMES DAILY
Qty: 10 TABLET | Refills: 0 | Status: SHIPPED | OUTPATIENT
Start: 2024-10-02 | End: 2024-10-07

## 2024-10-02 NOTE — PROGRESS NOTES
Ambulatory Visit  Name: Bunny Pandey      : 2005      MRN: [id number]  Encounter Provider: Huy Sanchez DO  Encounter Date: 10/2/2024   Encounter department: [facility]    Assessment & Plan  Viral infection    Orders:    methylPREDNISolone 4 MG tablet therapy pack; Use as directed on package    Sore throat    Orders:    doxycycline hyclate (VIBRA-TABS) 100 mg tablet; Take 1 tablet (100 mg total) by mouth 2 (two) times a day for 5 days         Water for hydration, Tylenol for aches.          History of Present Illness     Viral symptoms stated 48 hours ago, hurts to talk and swallow.          Review of Systems   Constitutional: Negative.  Negative for fever.   HENT:  Positive for congestion and sore throat.    Eyes: Negative.    Respiratory:  Positive for cough.         Occasional mild nonproductive cough.   Cardiovascular: Negative.    Gastrointestinal: Negative.    Genitourinary: Negative.    Musculoskeletal: Negative.    Skin: Negative.    Neurological: Negative.    Psychiatric/Behavioral: Negative.             Objective     /72 (BP Location: Left arm, Patient Position: Sitting, Cuff Size: Large)   Pulse 100   Temp 97.8 °F (36.6 °C) (Temporal)   Wt 96.9 kg (213 lb 9.6 oz)   SpO2 97%   BMI 36.66 kg/m²     Physical Exam  Vitals and nursing note reviewed.   Constitutional:       General: He is not in acute distress.     Appearance: He is well-developed.   HENT:      Head: Normocephalic and atraumatic.      Right Ear: Tympanic membrane, ear canal and external ear normal.      Left Ear: Tympanic membrane, ear canal and external ear normal.      Nose: Nose normal.      Mouth/Throat:      Mouth: Mucous membranes are moist.      Comments: Mild irritation back of throat    Eyes:      Conjunctiva/sclera: Conjunctivae normal.   Cardiovascular:      Rate and Rhythm: Normal rate and regular rhythm.      Heart sounds: No murmur heard.  Pulmonary:      Effort: Pulmonary effort is normal. No  respiratory distress.      Breath sounds: Normal breath sounds.   Abdominal:      General: Abdomen is flat. Bowel sounds are normal.      Palpations: Abdomen is soft.      Tenderness: There is no abdominal tenderness.   Musculoskeletal:         General: No swelling.      Cervical back: Neck supple.   Skin:     General: Skin is warm and dry.      Capillary Refill: Capillary refill takes less than 2 seconds.   Neurological:      Mental Status: He is alert.   Psychiatric:         Mood and Affect: Mood normal.         Behavior: Behavior normal.         Thought Content: Thought content normal.         Judgment: Judgment normal.

## 2024-10-22 ENCOUNTER — OFFICE VISIT (OUTPATIENT)
Dept: PSYCHIATRY | Facility: CLINIC | Age: 19
End: 2024-10-22
Payer: COMMERCIAL

## 2024-10-22 DIAGNOSIS — F90.9 ATTENTION DEFICIT HYPERACTIVITY DISORDER (ADHD), UNSPECIFIED ADHD TYPE: ICD-10-CM

## 2024-10-22 DIAGNOSIS — F41.9 ANXIETY DISORDER, UNSPECIFIED TYPE: Primary | ICD-10-CM

## 2024-10-22 PROCEDURE — 99213 OFFICE O/P EST LOW 20 MIN: CPT

## 2024-10-22 RX ORDER — FLUOXETINE 10 MG/1
CAPSULE ORAL
Qty: 91 CAPSULE | Refills: 1 | Status: SHIPPED | OUTPATIENT
Start: 2024-10-22

## 2024-10-22 RX ORDER — METHYLPHENIDATE HYDROCHLORIDE 54 MG/1
54 TABLET ORAL DAILY
Qty: 91 TABLET | Refills: 0 | Status: SHIPPED | OUTPATIENT
Start: 2024-10-22 | End: 2025-01-21

## 2024-10-22 NOTE — PSYCH
MEDICATION MANAGEMENT NOTE        [facility]      Name and Date of Birth:  Bunny Pandey 19 y.o. 2005 MRN: [id number]    Date of Visit: October 22, 2024    Reason for Visit: Follow-up visit regarding medication management     _____________________________    Assessment & Plan  Anxiety disorder, unspecified type    Patient reports he is doing well. He denies anxiety symptoms. He reports parents are supportive and that he has friends. Discussed possibility of implementing solution-focused counselling as a possibility to help him in his job search process and he declines. Encouraged to not take Concerta some days when it will not be needed. No changes are needed at this time.    Continue fluoxetine 30 mg QD for anxiety  Continue methylphenidate ER 54 mg QAM for inattentiveness  ECG for baseline    Orders:    FLUoxetine (PROzac) 10 mg capsule; TAKE 1 CAPSULE DAILY TOGETHER WITH 20MG FOR TOTAL OF 30MG A DAY    FLUoxetine (PROzac) 20 mg capsule; Take 1 capsule (20 mg total) by mouth daily    Attention deficit hyperactivity disorder (ADHD), unspecified ADHD type    Orders:    ECG 12 lead; Future    methylphenidate (CONCERTA) 54 MG ER tablet; Take 1 tablet (54 mg total) by mouth daily Max Daily Amount: 54 mg             Treatment Recommendations/Precautions:  Risks/benefits/alternatives to treatment discussed, including a myriad of potential adverse medication side effects, to which Bunny voiced understanding and consented fully to treatment.   Labs most recently obtained , reviewed.   Follow up in 12 weeks for medication management  Follow up with PCP for medical issues and ongoing care  Aware of 24 hour and weekend coverage for urgent situations accessed by calling [facility] main practice number      Treatment Plan:     Completed and signed during the session: Not applicable - Treatment Plan not due at this  "session    _____________________________________________    History of Present Illness     Chief Complaint: \"perfect\"    SUBJECTIVE:    Patient reports he is feeling \"perfect.\" He is currently continuing to look for jobs, concerned he has a bad resume, however continues to search despite the difficulty.  Anxiety symptoms continue to be well-controlled. Mood is \"good.\" Sleep is good and appetite is good. He is spending time looking for jobs. He reports he is getting along with parents and they are supportive and he also has friends.        Psychiatric Review Of Systems:  Unchanged information from this writer's previous assessment is copied and italicized; information that has changed is bolded.    Appetite: no  Adverse eating: no  Weight changes: no  Insomnia/sleeplessness: no  Fatigue/anergy: no  Anhedonia/lack of interest: no  Attention/concentration: no  Psychomotor agitation/retardation: no  Somatic symptoms: no  Anxiety/panic attack: no  Katia/hypomania: no  Hopelessness/helplessness/worthlessness: no  Self-injurious behavior/high-risk behavior: denies  Suicidal ideation: denies  Homicidal ideation: denies  Auditory hallucinations: denies  Visual hallucinations: denies  Delusional thinking: no  Obsessive/compulsive symptoms: no    Review Of Systems:      Constitutional negative   ENT negative   Cardiovascular negative   Respiratory negative   Gastrointestinal negative   Genitourinary negative   Musculoskeletal negative   Integumentary negative   Neurological negative   Endocrine negative   Other Symptoms none, all other systems are negative     Objective    OBJECTIVE:     Visit Vitals  Smoking Status Never      Wt Readings from Last 6 Encounters:   10/02/24 96.9 kg (213 lb 9.6 oz)   07/23/24 97.5 kg (215 lb)   08/07/23 95.6 kg (210 lb 12.8 oz)   09/19/22 93.6 kg (206 lb 6.4 oz) (95%, Z= 1.60)*   06/16/22 94.1 kg (207 lb 6.4 oz) (95%, Z= 1.64)*   02/10/22 96.2 kg (212 lb) (96%, Z= 1.77)*     * Growth percentiles " "are based on [facility] (Boys, 2-20 Years) data.        Past Medical History:   Diagnosis Date    Anxiety     Callus 12/11/2023    Depression     Excoriation (skin-picking) disorder 09/13/2019    Ingrown nail 06/06/2022    Obesity       Past Surgical History:   Procedure Laterality Date    LEG SURGERY      Osteochondroma       Meds/Allergies    Allergies   Allergen Reactions    Acyclovir Other (See Comments)    Penicillins     Shellfish-Derived Products - Food Allergy     Shellfish-Derived Products - Food Allergy Other (See Comments)     Tested positive for allergy    Penicillins Rash    Tomato - Food Allergy Rash     Current Outpatient Medications   Medication Instructions    FLUoxetine (PROzac) 10 mg capsule TAKE 1 CAPSULE DAILY TOGETHER WITH 20MG FOR TOTAL OF 30MG A DAY    FLUoxetine (PROZAC) 20 mg, Oral, Daily    methylphenidate (CONCERTA) 54 mg, Oral, Daily    methylPREDNISolone 4 MG tablet therapy pack Use as directed on package    Sodium Fluoride 5000 Plus 1.1 % CREA No dose, route, or frequency recorded.           Mental Status Exam:    Appearance Overweight, glasses, casual attire, sandals, good eye contact   Behavior/motor Calm and cooperative   Speech/language Normal rate and volume   Mood \"good\"   Affect euthymic, appropriate, and full range   Thought process Logical and linear   Thought content No overt delusions, Denies hallucinations, Denies suicidal ideations   Cognition Awake and alert, oriented and memory grossly intact, and concentrates on questions   Insight/judgment Insight: fair  Judgment: fair     Laboratory Results: I have personally reviewed all pertinent laboratory/tests results    No visits with results within 6 Month(s) from this visit.   Latest known visit with results is:   Orders Only on 10/12/2021   Component Date Value Ref Range Status    SARS-CoV-2 10/12/2021 Negative  Negative Final             ___________________________________    History Review: The following portions of the patient's " "history were reviewed and updated as appropriate: allergies, current medications, past family history, past medical history, past social history, past surgical history, and problem list.    Unchanged information from this writer's previous assessment is copied and italicized; information that has changed is bolded.    Past Psychiatric History:      Previous inpatient psychiatric admissions: denies  Previous intensive outpatient psychiatric services: denies  Previous inpatient/outpatient substance abuse rehabilitation: denies  Present/previous outpatient psychiatric services: Previously seeing Dr. Huy Morel since 2019, current  Present/previous psychotherapy services: Was in therapy for \"anger management before 7th grade  History of suicidal attempts/gestures: denies  History of violence/aggressive behaviors: denies   Present/previous psychotropic medication use:   Vyvanse up to 60 mg (significant weight loss)  Adderall XR 20 mg (irritability)  Strattera (ineffective)  Intuniv 1mg (discontinued, as he no longer needed it)  Focalin XR 30 mg (ineffective)  Prozac up to 30 mg     Substance Abuse History:     EtOH 8-oz beer 1-2X/week  Denies nicotine use  Denies cannabis use  Denies caffeine use  Denies all other substances     Family Psychiatric History:      Family History         Family History   Problem Relation Age of Onset    Asthma Mother      No Known Problems Father      Depression Maternal Grandfather                    Social History:     Developmental: denies a history of milestone/developmental delay. Denies a history of in-utero exposure to toxins/illicit substances. Did have an IEP for Math or need for special education.  Living arrangement: lives with mother and father, and pets  Academic history: graduated HS in 6/2022  Marital history: single  Social support system: friends, parent  Vocational History: unemployed, has upcoming interview for a warehouse  Access to firearms: denies direct access to " weapons/firearms. Bunny CALEB JainKatherin has no history of arrests or violence pertaining to use of a deadly weapon.      Traumatic History:      Denies  ___________________________________      Visit Time    Visit Start Time: 1041  Visit Stop Time: 1103  Total Visit Duration:  22 minutes    Balajitristan Egan MD   10/22/24

## 2024-10-22 NOTE — ASSESSMENT & PLAN NOTE
Orders:    ECG 12 lead; Future    methylphenidate (CONCERTA) 54 MG ER tablet; Take 1 tablet (54 mg total) by mouth daily Max Daily Amount: 54 mg

## 2024-10-22 NOTE — ASSESSMENT & PLAN NOTE
Patient reports he is doing well. He denies anxiety symptoms. He reports parents are supportive and that he has friends. Discussed possibility of implementing solution-focused counselling as a possibility to help him in his job search process and he declines. Encouraged to not take Concerta some days when it will not be needed. No changes are needed at this time.    Continue fluoxetine 30 mg QD for anxiety  Continue methylphenidate ER 54 mg QAM for inattentiveness  ECG for baseline    Orders:    FLUoxetine (PROzac) 10 mg capsule; TAKE 1 CAPSULE DAILY TOGETHER WITH 20MG FOR TOTAL OF 30MG A DAY    FLUoxetine (PROzac) 20 mg capsule; Take 1 capsule (20 mg total) by mouth daily

## 2024-12-04 ENCOUNTER — OFFICE VISIT (OUTPATIENT)
Dept: LAB | Age: 19
End: 2024-12-04
Payer: COMMERCIAL

## 2024-12-04 DIAGNOSIS — F90.9 ATTENTION DEFICIT HYPERACTIVITY DISORDER (ADHD), UNSPECIFIED ADHD TYPE: ICD-10-CM

## 2024-12-04 LAB
ATRIAL RATE: 67 BPM
P AXIS: 9 DEGREES
PR INTERVAL: 118 MS
QRS AXIS: -3 DEGREES
QRSD INTERVAL: 96 MS
QT INTERVAL: 388 MS
QTC INTERVAL: 410 MS
T WAVE AXIS: -2 DEGREES
VENTRICULAR RATE: 67 BPM

## 2024-12-04 PROCEDURE — 93005 ELECTROCARDIOGRAM TRACING: CPT

## 2024-12-04 PROCEDURE — 93010 ELECTROCARDIOGRAM REPORT: CPT | Performed by: STUDENT IN AN ORGANIZED HEALTH CARE EDUCATION/TRAINING PROGRAM

## 2025-01-21 ENCOUNTER — OFFICE VISIT (OUTPATIENT)
Dept: PSYCHIATRY | Facility: CLINIC | Age: 20
End: 2025-01-21
Payer: COMMERCIAL

## 2025-01-21 DIAGNOSIS — F41.9 ANXIETY DISORDER, UNSPECIFIED TYPE: Primary | ICD-10-CM

## 2025-01-21 DIAGNOSIS — F90.9 ATTENTION DEFICIT HYPERACTIVITY DISORDER (ADHD), UNSPECIFIED ADHD TYPE: ICD-10-CM

## 2025-01-21 PROCEDURE — 99213 OFFICE O/P EST LOW 20 MIN: CPT

## 2025-01-21 RX ORDER — METHYLPHENIDATE HYDROCHLORIDE 54 MG/1
54 TABLET ORAL DAILY
Qty: 30 TABLET | Refills: 0 | Status: SHIPPED | OUTPATIENT
Start: 2025-02-21 | End: 2025-03-23

## 2025-01-21 RX ORDER — METHYLPHENIDATE HYDROCHLORIDE 54 MG/1
54 TABLET ORAL DAILY
Qty: 30 TABLET | Refills: 0 | Status: SHIPPED | OUTPATIENT
Start: 2025-01-22 | End: 2025-02-21

## 2025-01-21 RX ORDER — METHYLPHENIDATE HYDROCHLORIDE 54 MG/1
54 TABLET ORAL DAILY
Qty: 30 TABLET | Refills: 0 | Status: SHIPPED | OUTPATIENT
Start: 2025-03-23 | End: 2025-04-22

## 2025-01-21 NOTE — ASSESSMENT & PLAN NOTE
Continue fluoxetine 30 mg QD for antidepressant maintenance  Continue methylphenidate ER 54 mg QAM for inattentiveness

## 2025-01-21 NOTE — PSYCH
"MEDICATION MANAGEMENT NOTE        [facility]      Name and Date of Birth:  Bunny Pandey 19 y.o. 2005 MRN: [id number]    Date of Visit: January 21, 2025    Reason for Visit: Follow-up visit regarding medication management     _____________________________    Assessment & Plan  Anxiety disorder, unspecified type  Continue fluoxetine 30 mg QD for antidepressant maintenance  Continue methylphenidate ER 54 mg QAM for inattentiveness       Attention deficit hyperactivity disorder (ADHD), unspecified ADHD type    Orders:    methylphenidate (CONCERTA) 54 MG ER tablet; Take 1 tablet (54 mg total) by mouth daily Max Daily Amount: 54 mg Do not start before January 22, 2025.    methylphenidate (CONCERTA) 54 MG ER tablet; Take 1 tablet (54 mg total) by mouth daily Max Daily Amount: 54 mg Do not start before February 21, 2025.    methylphenidate (CONCERTA) 54 MG ER tablet; Take 1 tablet (54 mg total) by mouth daily Max Daily Amount: 54 mg Do not start before March 23, 2025.           Patient seems to be doing well. He reports having begun working at a new job since last appointment and is happy with this. He denies problematic anxiety. He reports his mood is \"better,\" since he started working. He denies noticeable adverse effects of medication. He reports finding medication to be helpful.    ECG normal.    Treatment Recommendations/Precautions:  Risks/benefits/alternatives to treatment discussed, including a myriad of potential adverse medication side effects, to which Bunny voiced understanding and consented fully to treatment.   Labs most recently obtained , reviewed.   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 He is pleased with this. He reports it has been \"going great.\" Anxiety is \"pretty good.\" Denies feeling that anxiety or inattentiveness are impacting his ability to work. Feels medication are working as they are. Will take occasional days off from stimulant. Sleep is \"pretty good.\" Eating normally. Mood has been \"better.\" Attributes this to his job. Enjoys playing video games with friends.     Normal ECG        Psychiatric Review Of Systems:  Unchanged information from this writer's previous assessment is copied and italicized; information that has changed is bolded.    Appetite: no  Adverse eating: no  Weight changes: no  Insomnia/sleeplessness: no  Fatigue/anergy: no  Anhedonia/lack of interest: no  Attention/concentration: no  Psychomotor agitation/retardation: no  Somatic symptoms: no  Anxiety/panic attack: no  Katia/hypomania: no  Hopelessness/helplessness/worthlessness: no  Self-injurious behavior/high-risk behavior: denies  Suicidal ideation: denies  Homicidal ideation: denies  Auditory hallucinations: denies  Visual hallucinations: denies  Delusional thinking: no  Obsessive/compulsive symptoms: no    Review Of Systems:      Constitutional negative   ENT negative   Cardiovascular negative   Respiratory negative   Gastrointestinal negative   Genitourinary negative   Musculoskeletal negative   Integumentary negative   Neurological negative   Endocrine negative   Other Symptoms none, all other systems are negative     Objective    OBJECTIVE:     Visit Vitals  Smoking Status Never      Wt Readings from Last 6 Encounters:   10/02/24 96.9 kg (213 lb 9.6 oz)   07/23/24 97.5 kg (215 lb)   08/07/23 95.6 kg (210 lb 12.8 oz)   09/19/22 93.6 kg (206 lb 6.4 oz) (95%, Z= " "1.60)*   06/16/22 94.1 kg (207 lb 6.4 oz) (95%, Z= 1.64)*   02/10/22 96.2 kg (212 lb) (96%, Z= 1.77)*     * Growth percentiles are based on [facility] (Boys, 2-20 Years) data.        Past Medical History:   Diagnosis Date    Anxiety     Callus 12/11/2023    Depression     Excoriation (skin-picking) disorder 09/13/2019    Ingrown nail 06/06/2022    Obesity       Past Surgical History:   Procedure Laterality Date    LEG SURGERY      Osteochondroma       Meds/Allergies    Allergies   Allergen Reactions    Acyclovir Other (See Comments)    Penicillins     Shellfish-Derived Products - Food Allergy     Shellfish-Derived Products - Food Allergy Other (See Comments)     Tested positive for allergy    Penicillins Rash    Tomato - Food Allergy Rash     Current Outpatient Medications   Medication Instructions    FLUoxetine (PROzac) 10 mg capsule TAKE 1 CAPSULE DAILY TOGETHER WITH 20MG FOR TOTAL OF 30MG A DAY    FLUoxetine (PROZAC) 20 mg, Oral, Daily    [START ON 1/22/2025] methylphenidate (CONCERTA) 54 mg, Oral, Daily    [START ON 2/21/2025] methylphenidate (CONCERTA) 54 mg, Oral, Daily    [START ON 3/23/2025] methylphenidate (CONCERTA) 54 mg, Oral, Daily           Mental Status Exam:    Appearance Overweight, casual attire, blonde hair, glasses, good eye contact   Behavior/motor Calm and cooperative   Speech/language Normal rate and volume   Mood \"better\"   Affect euthymic, appropriate, and full range   Thought process Logical and linear   Thought content No overt delusions, Denies hallucinations, Denies suicidal ideations   Cognition Awake and alert, oriented and memory grossly intact, and concentrates on questions   Insight/judgment Insight: good  Judgment: good     Laboratory Results: I have personally reviewed all pertinent laboratory/tests results    Office Visit on 12/04/2024   Component Date Value Ref Range Status    Ventricular Rate 12/04/2024 67  BPM Final    Atrial Rate 12/04/2024 67  BPM Final    NV Interval 12/04/2024 " "118  ms Final    QRSD Interval 12/04/2024 96  ms Final    QT Interval 12/04/2024 388  ms Final    QTC Interval 12/04/2024 410  ms Final    P Axis 12/04/2024 9  degrees Final    QRS Axis 12/04/2024 -3  degrees Final    T Wave [place] 12/04/2024 -2  degrees Final             ___________________________________    History Review: The following portions of the patient's history were reviewed and updated as appropriate: allergies, current medications, past family history, past medical history, past social history, past surgical history, and problem list.    Unchanged information from this writer's previous assessment is copied and italicized; information that has changed is bolded.    Past Psychiatric History:      Previous inpatient psychiatric admissions: denies  Previous intensive outpatient psychiatric services: denies  Previous inpatient/outpatient substance abuse rehabilitation: denies  Present/previous outpatient psychiatric services: Previously seeing Dr. Huy Morel since 2019, current  Present/previous psychotherapy services: Was in therapy for \"anger management before 7th grade  History of suicidal attempts/gestures: denies  History of violence/aggressive behaviors: denies   Present/previous psychotropic medication use:   Vyvanse up to 60 mg (significant weight loss)  Adderall XR 20 mg (irritability)  Strattera (ineffective)  Intuniv 1mg (discontinued, as he no longer needed it)  Focalin XR 30 mg (ineffective)  Prozac up to 30 mg     Substance Abuse History:     EtOH 8-oz beer 1-2X/week  Denies nicotine use  Denies cannabis use  Denies caffeine use  Denies all other substances     Family Psychiatric History:      Family History             Family History   Problem Relation Age of Onset    Asthma Mother      No Known Problems Father      Depression Maternal Grandfather                    Social History:     Developmental: denies a history of milestone/developmental delay. Denies a history of in-utero exposure to " toxins/illicit substances. Did have an IEP for Math or need for special education.  Living arrangement: lives with mother and father, and pets  Academic history: graduated HS in 6/2022  Marital history: single  Social support system: friends, parent  Vocational History: unemployed, has upcoming interview for a warehouse  Access to firearms: denies direct access to weapons/firearms. Bunny Pandey has no history of arrests or violence pertaining to use of a deadly weapon.      Traumatic History:      Denies  ___________________________________      Visit Time    Visit Start Time: 0913  Visit Stop Time: 0930  Total Visit Duration:  17 minutes    Balaji Egan MD   01/21/25

## 2025-01-21 NOTE — ASSESSMENT & PLAN NOTE
Orders:    methylphenidate (CONCERTA) 54 MG ER tablet; Take 1 tablet (54 mg total) by mouth daily Max Daily Amount: 54 mg Do not start before January 22, 2025.    methylphenidate (CONCERTA) 54 MG ER tablet; Take 1 tablet (54 mg total) by mouth daily Max Daily Amount: 54 mg Do not start before February 21, 2025.    methylphenidate (CONCERTA) 54 MG ER tablet; Take 1 tablet (54 mg total) by mouth daily Max Daily Amount: 54 mg Do not start before March 23, 2025.

## 2025-01-21 NOTE — BH TREATMENT PLAN
TREATMENT PLAN - Medication Management        [facility]    Name and Date of Birth:  Bunny Pandey 19 y.o. 2005  Date of Treatment Plan: January 21, 2025  Diagnosis/Diagnoses:    1. Anxiety disorder, unspecified type    2. Attention deficit hyperactivity disorder (ADHD), unspecified ADHD type        Strengths/Personal Resources for Self-Care: supportive family, supportive friends, taking medications as prescribed, work skills    Area/Areas of need: anxiety, attention and concentration problems    Long Term Goal: maintain stability of anxiety  Target Date: 6 months - July 21, 2025  Person/Persons responsible for completion of goal: Bunny and Balaji Egan MD     Short Term Objective (s) - How will we reach this goal?:   Take medications as prescribed  Attend psychiatry appointments regularly  Follow up with medical providers  Get blood work drawn  Target Date: 6 months - July 21, 2025  Person/Persons Responsible for Completion of Goal: Bunny     Progress Towards Goals: Continuing treatment    Treatment Modality: medication management every 3 months or sooner if needed, consider starting psychotherapy in the future, and follow up with PCP  Review due 180 days from date of this plan: July 20, 2025   Expected length of service: Ongoing treatment    My physician and I have developed this plan together, and I agree to work on the goals and objectives. I understand the treatment goals that were developed for my treatment.    The treatment plan was created between Balaji Egan MD and Bunny Pandey on 01/21/25 at 9:43 AM.

## 2025-04-21 DIAGNOSIS — F41.9 ANXIETY DISORDER, UNSPECIFIED TYPE: ICD-10-CM

## 2025-04-21 RX ORDER — FLUOXETINE 10 MG/1
CAPSULE ORAL
Qty: 91 CAPSULE | Refills: 0 | Status: SHIPPED | OUTPATIENT
Start: 2025-04-21

## 2025-04-25 ENCOUNTER — OFFICE VISIT (OUTPATIENT)
Dept: PSYCHIATRY | Facility: CLINIC | Age: 20
End: 2025-04-25
Payer: COMMERCIAL

## 2025-04-25 DIAGNOSIS — F41.9 ANXIETY DISORDER, UNSPECIFIED TYPE: ICD-10-CM

## 2025-04-25 DIAGNOSIS — F90.9 ATTENTION DEFICIT HYPERACTIVITY DISORDER (ADHD), UNSPECIFIED ADHD TYPE: Primary | ICD-10-CM

## 2025-04-25 PROCEDURE — 99213 OFFICE O/P EST LOW 20 MIN: CPT

## 2025-04-25 RX ORDER — METHYLPHENIDATE HYDROCHLORIDE 54 MG/1
54 TABLET ORAL DAILY
Qty: 30 TABLET | Refills: 0 | Status: SHIPPED | OUTPATIENT
Start: 2025-06-24 | End: 2025-07-24

## 2025-04-25 RX ORDER — METHYLPHENIDATE HYDROCHLORIDE 54 MG/1
54 TABLET ORAL DAILY
Qty: 30 TABLET | Refills: 0 | Status: SHIPPED | OUTPATIENT
Start: 2025-04-25 | End: 2025-05-25

## 2025-04-25 RX ORDER — METHYLPHENIDATE HYDROCHLORIDE 54 MG/1
54 TABLET ORAL DAILY
Qty: 30 TABLET | Refills: 0 | Status: SHIPPED | OUTPATIENT
Start: 2025-05-25 | End: 2025-06-24

## 2025-04-25 NOTE — ASSESSMENT & PLAN NOTE
Continue fluoxetine 30 mg QD for anxiety symptoms  Continue methylphenidate ER 54 mg QD for inattentive symptoms  Orders:    methylphenidate (CONCERTA) 54 MG ER tablet; Take 1 tablet (54 mg total) by mouth daily Max Daily Amount: 54 mg    methylphenidate (CONCERTA) 54 MG ER tablet; Take 1 tablet (54 mg total) by mouth daily Max Daily Amount: 54 mg Do not start before May 25, 2025.    methylphenidate (CONCERTA) 54 MG ER tablet; Take 1 tablet (54 mg total) by mouth daily Max Daily Amount: 54 mg Do not start before June 24, 2025.

## 2025-04-25 NOTE — PSYCH
MEDICATION MANAGEMENT NOTE        [facility]      Name and Date of Birth:  Bunny Pandey 19 y.o. 2005 MRN: [id number]    Date of Visit: April 25, 2025    Reason for Visit: Follow-up visit regarding medication management     _____________________________    Assessment & Plan  Attention deficit hyperactivity disorder (ADHD), unspecified ADHD type  Continue fluoxetine 30 mg QD for anxiety symptoms  Continue methylphenidate ER 54 mg QD for inattentive symptoms  Orders:    methylphenidate (CONCERTA) 54 MG ER tablet; Take 1 tablet (54 mg total) by mouth daily Max Daily Amount: 54 mg    methylphenidate (CONCERTA) 54 MG ER tablet; Take 1 tablet (54 mg total) by mouth daily Max Daily Amount: 54 mg Do not start before May 25, 2025.    methylphenidate (CONCERTA) 54 MG ER tablet; Take 1 tablet (54 mg total) by mouth daily Max Daily Amount: 54 mg Do not start before June 24, 2025.    Anxiety disorder, unspecified type                Patient continues to report stability and remission of symptoms with current medication without concern for adverse effects. No changes indicated. Reports to be functioning well, working, and socially. No medical issues. Sleeping and eating normally.    Treatment Recommendations/Precautions:  Risks/benefits/alternatives to treatment discussed, including a myriad of potential adverse medication side effects, to which Bunny voiced understanding and consented fully to treatment.   Labs most recently obtained , reviewed.   Follow up in 3 months for medication management  Follow up with PCP for medical issues and ongoing care  Aware of 24 hour and weekend coverage for urgent situations accessed by calling [facility] main practice number      Treatment Plan:     Completed and signed during the session: Not applicable - Treatment Plan not due at this session    _____________________________________________    History of Present  "Illness     Chief Complaint: \"pretty good\"    SUBJECTIVE:    Patient reports feeling \"pretty good.\" No issues with medication. 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 Medications working as expected. Sleeping normally and eating normally. Has friends. Everyone at home getting along.        Psychiatric Review Of Systems:  Unchanged information from this writer's previous assessment is copied and italicized; information that has changed is bolded.    Appetite: no  Adverse eating: no  Weight changes: no  Insomnia/sleeplessness: no  Fatigue/anergy: no  Anhedonia/lack of interest: no  Attention/concentration: no  Psychomotor agitation/retardation: no  Somatic symptoms: no  Anxiety/panic attack: no  Katia/hypomania: no  Hopelessness/helplessness/worthlessness: no  Self-injurious behavior/high-risk behavior: denies  Suicidal ideation: denies  Homicidal ideation: denies  Auditory hallucinations: denies  Visual hallucinations: denies  Delusional thinking: no  Obsessive/compulsive symptoms: no    Review Of Systems:      Constitutional negative   ENT negative   Cardiovascular negative   Respiratory negative   Gastrointestinal negative   Genitourinary negative   Musculoskeletal negative   Integumentary negative   Neurological negative   Endocrine negative   Other Symptoms none, all other systems are negative     Objective    OBJECTIVE:     Visit Vitals  Smoking Status Never      Wt Readings from Last 6 Encounters:   10/02/24 96.9 kg (213 lb 9.6 oz)   07/23/24 97.5 kg (215 lb)   08/07/23 95.6 kg (210 lb 12.8 oz)   09/19/22 93.6 kg (206 lb 6.4 oz) (95%, Z= 1.60)*   06/16/22 94.1 kg (207 lb 6.4 oz) (95%, Z= 1.64)*   02/10/22 96.2 kg (212 lb) (96%, Z= 1.77)*     * Growth percentiles are based on [facility] (Boys, 2-20 Years) data.        Past Medical History:   Diagnosis Date    Anxiety     Callus 12/11/2023    Depression     Excoriation (skin-picking) disorder 09/13/2019    Ingrown nail 06/06/2022    Obesity       Past " "Surgical History:   Procedure Laterality Date    LEG SURGERY      Osteochondroma       Meds/Allergies    Allergies   Allergen Reactions    Acyclovir Other (See Comments)    Penicillins     Shellfish-Derived Products - Food Allergy     Shellfish-Derived Products - Food Allergy Other (See Comments)     Tested positive for allergy    Penicillins Rash    Tomato - Food Allergy Rash     Current Outpatient Medications   Medication Instructions    FLUoxetine (PROzac) 10 mg capsule TAKE ONE CAPSULE BY MOUTH EVERY DAY WITH 20 MG    FLUoxetine (PROZAC) 20 mg, Oral, Daily    methylphenidate (CONCERTA) 54 mg, Oral, Daily    [START ON 5/25/2025] methylphenidate (CONCERTA) 54 mg, Oral, Daily    [START ON 6/24/2025] methylphenidate (CONCERTA) 54 mg, Oral, Daily           Mental Status Exam:    Appearance Overweight, red short sleeve shirt, blonde hair a little disheveled, glasses, good eye contact   Behavior/motor Calm and cooperative   Speech/language Normal rate and volume   Mood \"Pretty good\"   Affect euthymic, appropriate, and full range   Thought process Logical and linear   Thought content No overt delusions, Denies hallucinations, Denies suicidal ideations   Cognition Awake and alert, oriented and memory grossly intact, and concentrates on questions   Insight/judgment Insight: good  Judgment: good     Laboratory Results: I have personally reviewed all pertinent laboratory/tests results    Office Visit on 12/04/2024   Component Date Value Ref Range Status    Ventricular Rate 12/04/2024 67  BPM Final    Atrial Rate 12/04/2024 67  BPM Final    GA Interval 12/04/2024 118  ms Final    QRSD Interval 12/04/2024 96  ms Final    QT Interval 12/04/2024 388  ms Final    QTC Interval 12/04/2024 410  ms Final    P Axis 12/04/2024 9  degrees Final    QRS Axis 12/04/2024 -3  degrees Final    T Wave [place] 12/04/2024 -2  degrees Final             ___________________________________    History Review: The following portions of the patient's " "history were reviewed and updated as appropriate: allergies, current medications, past family history, past medical history, past social history, past surgical history, and problem list.    Unchanged information from this writer's previous assessment is copied and italicized; information that has changed is bolded.    Past Psychiatric History:      Previous inpatient psychiatric admissions: denies  Previous intensive outpatient psychiatric services: denies  Previous inpatient/outpatient substance abuse rehabilitation: denies  Present/previous outpatient psychiatric services: Previously seeing Dr. Huy Morel since 2019, current  Present/previous psychotherapy services: Was in therapy for \"anger management before 7th grade  History of suicidal attempts/gestures: denies  History of violence/aggressive behaviors: denies   Present/previous psychotropic medication use:   Vyvanse up to 60 mg (significant weight loss)  Adderall XR 20 mg (irritability)  Strattera (ineffective)  Intuniv 1mg (discontinued, as he no longer needed it)  Focalin XR 30 mg (ineffective)  Prozac up to 30 mg     Substance Abuse History:     EtOH 8-oz beer 1-2X/week  Denies nicotine use  Denies cannabis use  Denies caffeine use  Denies all other substances     Family Psychiatric History:      Family History             Family History   Problem Relation Age of Onset    Asthma Mother      No Known Problems Father      Depression Maternal Grandfather                    Social History:     Developmental: denies a history of milestone/developmental delay. Denies a history of in-utero exposure to toxins/illicit substances. Did have an IEP for Math or need for special education.  Living arrangement: lives with mother and father, and pets  Academic history: graduated HS in 6/2022  Marital history: single  Social support system: friends, parent  Vocational History: unemployed, has upcoming interview for a warehouse  Access to firearms: denies direct access to " weapons/firearms. Bunny CALEB JainKatherin has no history of arrests or violence pertaining to use of a deadly weapon.      Traumatic History:      Denies  ___________________________________      Visit Time    Visit Start Time: 0907  Visit Stop Time: 0924  Total Visit Duration:  17 minutes    Balaji Egan MD   04/25/25

## 2025-04-29 ENCOUNTER — TELEPHONE (OUTPATIENT)
Dept: PSYCHIATRY | Facility: CLINIC | Age: 20
End: 2025-04-29

## 2025-04-29 NOTE — TELEPHONE ENCOUNTER
Patient called for a refill on methylphenidate  . Patient made aware that the provider sent a script in 04/25/25 but because the last refill was filled on 04/02/25, he will not be able to fill until 05/02/25 . Patient will contact the pharmacy for the refill.

## 2025-05-19 ENCOUNTER — TELEPHONE (OUTPATIENT)
Dept: PSYCHIATRY | Facility: CLINIC | Age: 20
End: 2025-05-19

## 2025-05-19 NOTE — TELEPHONE ENCOUNTER
Writer called patient and LVM to call office to make f/u appt.  Transfer call to resident MR to make appt.     Next appt end of July [place]

## 2025-07-18 ENCOUNTER — OFFICE VISIT (OUTPATIENT)
Dept: PSYCHIATRY | Facility: CLINIC | Age: 20
End: 2025-07-18

## 2025-07-18 DIAGNOSIS — F41.9 ANXIETY DISORDER, UNSPECIFIED TYPE: ICD-10-CM

## 2025-07-18 DIAGNOSIS — F90.9 ATTENTION DEFICIT HYPERACTIVITY DISORDER (ADHD), UNSPECIFIED ADHD TYPE: Primary | ICD-10-CM

## 2025-07-18 PROCEDURE — PBNCHG PB NO CHARGE PLACEHOLDER

## 2025-07-18 RX ORDER — METHYLPHENIDATE HYDROCHLORIDE 54 MG/1
54 TABLET ORAL DAILY
Qty: 30 TABLET | Refills: 0 | Status: SHIPPED | OUTPATIENT
Start: 2025-09-06 | End: 2025-10-06

## 2025-07-18 RX ORDER — METHYLPHENIDATE HYDROCHLORIDE 54 MG/1
54 TABLET ORAL DAILY
Qty: 30 TABLET | Refills: 0 | Status: SHIPPED | OUTPATIENT
Start: 2025-10-06 | End: 2025-11-05

## 2025-07-18 RX ORDER — FLUOXETINE 10 MG/1
CAPSULE ORAL
Qty: 90 CAPSULE | Refills: 1 | Status: SHIPPED | OUTPATIENT
Start: 2025-07-18

## 2025-07-18 RX ORDER — METHYLPHENIDATE HYDROCHLORIDE 54 MG/1
54 TABLET ORAL DAILY
Qty: 30 TABLET | Refills: 0 | Status: SHIPPED | OUTPATIENT
Start: 2025-08-07 | End: 2025-09-06

## 2025-07-18 NOTE — PSYCH
"MEDICATION MANAGEMENT NOTE        [facility]      Name and Date of Birth:  Bunny Pandey 20 y.o. 2005 MRN: [id number]    Date of Visit: July 18, 2025    Reason for Visit: Follow-up visit regarding medication management     _____________________________    Assessment & Plan  Attention deficit hyperactivity disorder (ADHD), unspecified ADHD type  Continue fluoxetine 30 mg QD for anxiety symptoms  Continue methylphenidate ER 54 mg QD for inattentive symptoms    Orders:  •  methylphenidate (CONCERTA) 54 MG ER tablet; Take 1 tablet (54 mg total) by mouth daily Max Daily Amount: 54 mg Do not start before August 7, 2025.  •  methylphenidate (CONCERTA) 54 MG ER tablet; Take 1 tablet (54 mg total) by mouth daily Max Daily Amount: 54 mg Do not start before September 6, 2025.  •  methylphenidate (CONCERTA) 54 MG ER tablet; Take 1 tablet (54 mg total) by mouth daily Max Daily Amount: 54 mg Do not start before October 6, 2025.     Anxiety disorder, unspecified type    Orders:  •  FLUoxetine (PROzac) 20 mg capsule; Take 1 capsule (20 mg total) by mouth daily  •  FLUoxetine (PROzac) 10 mg capsule; TAKE ONE CAPSULE BY MOUTH EVERY DAY WITH 20 MG           Patient reports continued stability and denies adverse effects of medication. Physical health, family and friend relationships, job all seem stable. No recent anxiety symptoms. He reports taking stimulant daily and is is counselled to take at least one day/week off and to consider taking it only in mornings days he is to work and he is understanding of this. Trying to better characterize past anxiety for which he takes fluoxetine and has no longer had symptoms in some time (he was unable to recall how long), anxiety was not experienced in the context of one particular situation, such as social anxiety, but more about \"small things.\" However he also said it was not persistent and denied any accompanying somatic symptoms, " "though it may not be remembered well. When asked about past diagnosis of excoriation disorder, he confirmed this however this has also resolved years ago and no further detail could be provided. There is no need for any changes at this time, the patient is doing well. He reports he follows with family medicine as well.    Treatment Recommendations/Precautions:  Risks/benefits/alternatives to treatment discussed, including a myriad of potential adverse medication side effects, to which Bunny voiced understanding and consented fully to treatment.   Labs most recently obtained , reviewed.   Follow up in 3 months for medication management  Follow up with PCP for medical issues and ongoing care  Aware of 24 hour and weekend coverage for urgent situations accessed by calling [facility] main practice number      Treatment Plan:     Completed and signed during the session: Yes - with Bunny    _____________________________________________    History of Present Illness     Chief Complaint: \"I'm good\"    SUBJECTIVE:    Patient reports feeling fine. Continues to work at [employer]. Sleep is pretty good. Normal appetite. Has sufficient exercise. Has socialization. Medication is working as expected. Denies anxiety symptoms. Living with parents, getting along.        Psychiatric Review Of Systems:  Unchanged information from this writer's previous assessment is copied and italicized; information that has changed is bolded.    Appetite: no  Adverse eating: no  Weight changes: no  Insomnia/sleeplessness: no  Fatigue/anergy: no  Anhedonia/lack of interest: no  Attention/concentration: no  Psychomotor agitation/retardation: no  Somatic symptoms: no  Anxiety/panic attack: no  Katia/hypomania: no  Hopelessness/helplessness/worthlessness: no  Self-injurious behavior/high-risk behavior: denies  Suicidal ideation: denies  Homicidal ideation: denies  Auditory hallucinations: denies  Visual hallucinations: " "denies  Delusional thinking: no  Obsessive/compulsive symptoms: no    Review Of Systems:      Constitutional negative   ENT negative   Cardiovascular negative   Respiratory negative   Gastrointestinal negative   Genitourinary negative   Musculoskeletal negative   Integumentary negative   Neurological negative   Endocrine negative   Other Symptoms none, all other systems are negative     Objective    OBJECTIVE:     Visit Vitals  Smoking Status Never      Wt Readings from Last 6 Encounters:   10/02/24 96.9 kg (213 lb 9.6 oz)   07/23/24 97.5 kg (215 lb)   08/07/23 95.6 kg (210 lb 12.8 oz)   09/19/22 93.6 kg (206 lb 6.4 oz) (95%, Z= 1.60)*   06/16/22 94.1 kg (207 lb 6.4 oz) (95%, Z= 1.64)*   02/10/22 96.2 kg (212 lb) (96%, Z= 1.77)*     * Growth percentiles are based on [facility] (Boys, 2-20 Years) data.        Past Medical History[1]   Past Surgical History[1]    Meds/Allergies    Allergies[1]  Current Outpatient Medications   Medication Instructions   • FLUoxetine (PROzac) 10 mg capsule TAKE ONE CAPSULE BY MOUTH EVERY DAY WITH 20 MG   • FLUoxetine (PROZAC) 20 mg, Oral, Daily   • [START ON 8/7/2025] methylphenidate (CONCERTA) 54 mg, Oral, Daily   • [START ON 9/6/2025] methylphenidate (CONCERTA) 54 mg, Oral, Daily   • [START ON 10/6/2025] methylphenidate (CONCERTA) 54 mg, Oral, Daily           Mental Status Exam:    Appearance Casual attire, sandals, ear length wavy blonde somewhat unkempt hair, glasses, good eye contact   Behavior/motor Calm and cooperative   Speech/language Normal rate and volume   Mood \"good\"   Affect euthymic, appropriate, and full range   Thought process Logical and linear   Thought content No overt delusions, Denies hallucinations, Denies suicidal ideations   Cognition Awake and alert, oriented and memory grossly intact, and concentrates on questions   Insight/judgment Insight: good  Judgment: good     Laboratory Results: I have personally reviewed all pertinent laboratory/tests results    No visits " "with results within 6 Month(s) from this visit.   Latest known visit with results is:   Office Visit on 12/04/2024   Component Date Value Ref Range Status   • Ventricular Rate 12/04/2024 67  BPM Final   • Atrial Rate 12/04/2024 67  BPM Final   • WI Interval 12/04/2024 118  ms Final   • QRSD Interval 12/04/2024 96  ms Final   • QT Interval 12/04/2024 388  ms Final   • QTC Interval 12/04/2024 410  ms Final   • P Axis 12/04/2024 9  degrees Final   • QRS Axis 12/04/2024 -3  degrees Final   • T Wave Axis 12/04/2024 -2  degrees Final             ___________________________________    History Review: The following portions of the patient's history were reviewed and updated as appropriate: allergies, current medications, past family history, past medical history, past social history, past surgical history, and problem list.    Unchanged information from this writer's previous assessment is copied and italicized; information that has changed is bolded.    Past Psychiatric History:      Previous inpatient psychiatric admissions: denies  Previous intensive outpatient psychiatric services: denies  Previous inpatient/outpatient substance abuse rehabilitation: denies  Present/previous outpatient psychiatric services: Previously seeing Dr. Huy Morel since 2019, current  Present/previous psychotherapy services: Was in therapy for \"anger management before 7th grade  History of suicidal attempts/gestures: denies  History of violence/aggressive behaviors: denies   Present/previous psychotropic medication use:   Vyvanse up to 60 mg (significant weight loss)  Adderall XR 20 mg (irritability)  Strattera (ineffective)  Intuniv 1mg (discontinued, as he no longer needed it)  Focalin XR 30 mg (ineffective)  Prozac up to 30 mg     Substance Abuse History:     EtOH 8-oz beer 1-2X/week  Denies nicotine use  Denies cannabis use  Denies caffeine use  Denies all other substances     Family Psychiatric History:      Family History           "   Family History   Problem Relation Age of Onset   • Asthma Mother     • No Known Problems Father     • Depression Maternal Grandfather                    Social History:     Developmental: denies a history of milestone/developmental delay. Denies a history of in-utero exposure to toxins/illicit substances. Did have an IEP for Math or need for special education.  Living arrangement: lives with mother and father, and pets  Academic history: graduated HS in 6/2022  Marital history: single  Social support system: friends, parent  Vocational History: unemployed, has upcoming interview for a warehouse  Access to firearms: denies direct access to weapons/firearms. Bunny Pandey has no history of arrests or violence pertaining to use of a deadly weapon.      Traumatic History:      Denies  ___________________________________      Visit Time    Visit Start Time: 0818  Visit Stop Time: 0835  Total Visit Duration: 17 minutes    Balaji Alfredo Egan MD   07/18/25         [1]  Past Medical History:  Diagnosis Date   • Anxiety    • Callus 12/11/2023   • Depression    • Excoriation (skin-picking) disorder 09/13/2019   • Ingrown nail 06/06/2022   • Obesity    [1]  Past Surgical History:  Procedure Laterality Date   • LEG SURGERY      Osteochondroma   [1]  Allergies  Allergen Reactions   • Acyclovir Other (See Comments)   • Penicillins    • Shellfish-Derived Products - Food Allergy    • Shellfish-Derived Products - Food Allergy Other (See Comments)     Tested positive for allergy   • Penicillins Rash   • Tomato - Food Allergy Rash

## 2025-07-18 NOTE — ASSESSMENT & PLAN NOTE
Orders:  •  FLUoxetine (PROzac) 20 mg capsule; Take 1 capsule (20 mg total) by mouth daily  •  FLUoxetine (PROzac) 10 mg capsule; TAKE ONE CAPSULE BY MOUTH EVERY DAY WITH 20 MG

## 2025-07-18 NOTE — PSYCH
TREATMENT PLAN - Medication Management        [facility]    Name and Date of Birth:  Bunny Pandey 20 y.o. 2005  Date of Treatment Plan: July 18, 2025  Diagnosis/Diagnoses:    1. Attention deficit hyperactivity disorder (ADHD), unspecified ADHD type    2. Anxiety disorder, unspecified type        Strengths/Personal Resources for Self-Care: supportive family, supportive friends, taking medications as prescribed, ability to communicate well, ability to understand psychiatric illness, stable employment    Area/Areas of need: ADHD symptoms    Long Term Goal: maintain improvement in ADHD symptoms  Target Date: 6 months - January 18, 2026  Person/Persons responsible for completion of goal: Bunny and Balaji Egan MD     Short Term Objective (s) - How will we reach this goal?:   Take medications as prescribed  Attend psychiatry appointments regularly  Follow up with medical providers  Get blood work drawn  Target Date: 6 months - January 18, 2026  Person/Persons Responsible for Completion of Goal: Bunny     Progress Towards Goals: Continuing treatment    Treatment Modality: medication management every 3 months or sooner if needed and follow up with PCP  Review due 180 days from date of this plan: January 14, 2026   Expected length of service: Ongoing treatment    My physician and I have developed this plan together, and I agree to work on the goals and objectives. I understand the treatment goals that were developed for my treatment.    The treatment plan was created between Balaji Egan MD and Bunny Pandey on 07/18/25 at 8:53 AM.

## 2025-07-18 NOTE — ASSESSMENT & PLAN NOTE
Continue fluoxetine 30 mg QD for anxiety symptoms  Continue methylphenidate ER 54 mg QD for inattentive symptoms    Orders:  •  methylphenidate (CONCERTA) 54 MG ER tablet; Take 1 tablet (54 mg total) by mouth daily Max Daily Amount: 54 mg Do not start before August 7, 2025.  •  methylphenidate (CONCERTA) 54 MG ER tablet; Take 1 tablet (54 mg total) by mouth daily Max Daily Amount: 54 mg Do not start before September 6, 2025.  •  methylphenidate (CONCERTA) 54 MG ER tablet; Take 1 tablet (54 mg total) by mouth daily Max Daily Amount: 54 mg Do not start before October 6, 2025.